# Patient Record
Sex: FEMALE | Race: WHITE | NOT HISPANIC OR LATINO | Employment: FULL TIME
[De-identification: names, ages, dates, MRNs, and addresses within clinical notes are randomized per-mention and may not be internally consistent; named-entity substitution may affect disease eponyms.]

---

## 2017-01-03 ENCOUNTER — HOSPITAL ENCOUNTER (OUTPATIENT)
Dept: PHYSICAL THERAPY | Age: 32
Setting detail: THERAPIES SERIES
Discharge: STILL ADMITTED OR ADMINISTRATIVE | End: 2017-01-03
Attending: NURSE PRACTITIONER

## 2017-01-03 ENCOUNTER — HOSPITAL ENCOUNTER (OUTPATIENT)
Dept: NEUROLOGY | Facility: CLINIC | Age: 32
Setting detail: THERAPIES SERIES
Discharge: STILL ADMITTED OR ADMINISTRATIVE | End: 2017-01-03
Attending: NURSE PRACTITIONER

## 2017-01-03 DIAGNOSIS — G44.209 TENSION HEADACHE: ICD-10-CM

## 2017-01-03 DIAGNOSIS — F43.23 ADJUSTMENT DISORDER WITH MIXED ANXIETY AND DEPRESSED MOOD: ICD-10-CM

## 2017-01-03 DIAGNOSIS — F07.81 POST CONCUSSION SYNDROME: ICD-10-CM

## 2017-01-03 DIAGNOSIS — M54.2 NECK PAIN: ICD-10-CM

## 2017-01-04 ENCOUNTER — OFFICE VISIT - HEALTHEAST (OUTPATIENT)
Dept: PHYSICAL THERAPY | Facility: REHABILITATION | Age: 32
End: 2017-01-04

## 2017-01-04 DIAGNOSIS — M53.3 SI (SACROILIAC) JOINT DYSFUNCTION: ICD-10-CM

## 2017-01-04 DIAGNOSIS — M25.551 PAIN IN JOINT, PELVIC REGION AND THIGH, RIGHT: ICD-10-CM

## 2017-01-04 DIAGNOSIS — M62.81 MUSCLE WEAKNESS (GENERALIZED): ICD-10-CM

## 2017-01-05 ENCOUNTER — HOSPITAL ENCOUNTER (OUTPATIENT)
Dept: OCCUPATIONAL THERAPY | Age: 32
Setting detail: THERAPIES SERIES
Discharge: STILL ADMITTED OR ADMINISTRATIVE | End: 2017-01-05
Attending: OCCUPATIONAL THERAPIST

## 2017-01-05 DIAGNOSIS — H53.9 VISUAL DISTURBANCE: ICD-10-CM

## 2017-01-05 DIAGNOSIS — F07.81 POST CONCUSSION SYNDROME: ICD-10-CM

## 2017-01-11 ENCOUNTER — OFFICE VISIT - HEALTHEAST (OUTPATIENT)
Dept: PHYSICAL THERAPY | Facility: REHABILITATION | Age: 32
End: 2017-01-11

## 2017-01-11 DIAGNOSIS — M25.551 PAIN IN JOINT, PELVIC REGION AND THIGH, RIGHT: ICD-10-CM

## 2017-01-11 DIAGNOSIS — M53.3 SI (SACROILIAC) JOINT DYSFUNCTION: ICD-10-CM

## 2017-01-11 DIAGNOSIS — M62.81 MUSCLE WEAKNESS (GENERALIZED): ICD-10-CM

## 2017-01-12 ENCOUNTER — HOSPITAL ENCOUNTER (OUTPATIENT)
Dept: NEUROLOGY | Facility: CLINIC | Age: 32
Setting detail: THERAPIES SERIES
Discharge: STILL ADMITTED OR ADMINISTRATIVE | End: 2017-01-12
Attending: PSYCHOLOGIST

## 2017-01-12 DIAGNOSIS — F07.81 POST CONCUSSION SYNDROME: ICD-10-CM

## 2017-01-12 DIAGNOSIS — Z34.90 PREGNANCY: ICD-10-CM

## 2017-01-12 DIAGNOSIS — S06.9XAS HEADACHE AS LATE EFFECT OF BRAIN INJURY (H): ICD-10-CM

## 2017-01-12 DIAGNOSIS — S06.9X0S MILD TBI, WITHOUT LOSS OF CONSCIOUSNESS, SEQUELA: ICD-10-CM

## 2017-01-12 DIAGNOSIS — F43.23 ADJUSTMENT DISORDER WITH MIXED ANXIETY AND DEPRESSED MOOD: ICD-10-CM

## 2017-01-12 DIAGNOSIS — G44.309 HEADACHE AS LATE EFFECT OF BRAIN INJURY (H): ICD-10-CM

## 2017-01-17 ENCOUNTER — HOSPITAL ENCOUNTER (OUTPATIENT)
Dept: NEUROLOGY | Facility: CLINIC | Age: 32
Setting detail: THERAPIES SERIES
Discharge: STILL ADMITTED OR ADMINISTRATIVE | End: 2017-01-17
Attending: NURSE PRACTITIONER

## 2017-01-17 ENCOUNTER — HOSPITAL ENCOUNTER (OUTPATIENT)
Dept: PHYSICAL THERAPY | Age: 32
Setting detail: THERAPIES SERIES
Discharge: STILL ADMITTED OR ADMINISTRATIVE | End: 2017-01-17
Attending: PHYSICAL THERAPIST

## 2017-01-17 DIAGNOSIS — G44.209 TENSION HEADACHE: ICD-10-CM

## 2017-01-17 DIAGNOSIS — F06.4 ANXIETY DISORDER DUE TO MEDICAL CONDITION: ICD-10-CM

## 2017-01-17 DIAGNOSIS — M54.2 NECK PAIN: ICD-10-CM

## 2017-01-17 DIAGNOSIS — F07.81 POST CONCUSSION SYNDROME: ICD-10-CM

## 2017-01-17 DIAGNOSIS — S06.9XAS HEADACHE AS LATE EFFECT OF BRAIN INJURY (H): ICD-10-CM

## 2017-01-17 DIAGNOSIS — G44.309 HEADACHE AS LATE EFFECT OF BRAIN INJURY (H): ICD-10-CM

## 2017-01-18 ENCOUNTER — OFFICE VISIT - HEALTHEAST (OUTPATIENT)
Dept: FAMILY MEDICINE | Facility: CLINIC | Age: 32
End: 2017-01-18

## 2017-01-18 ENCOUNTER — OFFICE VISIT - HEALTHEAST (OUTPATIENT)
Dept: PHYSICAL THERAPY | Facility: REHABILITATION | Age: 32
End: 2017-01-18

## 2017-01-18 DIAGNOSIS — M53.3 SI (SACROILIAC) JOINT DYSFUNCTION: ICD-10-CM

## 2017-01-18 DIAGNOSIS — J32.9 SINUSITIS: ICD-10-CM

## 2017-01-18 DIAGNOSIS — J45.20 MILD INTERMITTENT ASTHMA WITHOUT COMPLICATION: ICD-10-CM

## 2017-01-18 DIAGNOSIS — M62.81 MUSCLE WEAKNESS (GENERALIZED): ICD-10-CM

## 2017-01-18 DIAGNOSIS — M25.551 PAIN IN JOINT, PELVIC REGION AND THIGH, RIGHT: ICD-10-CM

## 2017-01-18 ASSESSMENT — MIFFLIN-ST. JEOR: SCORE: 1641.16

## 2017-01-25 ENCOUNTER — OFFICE VISIT - HEALTHEAST (OUTPATIENT)
Dept: PHYSICAL THERAPY | Facility: REHABILITATION | Age: 32
End: 2017-01-25

## 2017-01-25 DIAGNOSIS — M25.551 PAIN IN JOINT, PELVIC REGION AND THIGH, RIGHT: ICD-10-CM

## 2017-01-25 DIAGNOSIS — M53.3 SI (SACROILIAC) JOINT DYSFUNCTION: ICD-10-CM

## 2017-01-25 DIAGNOSIS — M62.81 MUSCLE WEAKNESS (GENERALIZED): ICD-10-CM

## 2017-01-26 ENCOUNTER — HOSPITAL ENCOUNTER (OUTPATIENT)
Dept: OBGYN | Facility: HOSPITAL | Age: 32
Discharge: HOME | End: 2017-01-26
Attending: OBSTETRICS & GYNECOLOGY | Admitting: OBSTETRICS & GYNECOLOGY

## 2017-01-26 ASSESSMENT — MIFFLIN-ST. JEOR: SCORE: 1641.16

## 2017-01-31 ENCOUNTER — HOSPITAL ENCOUNTER (OUTPATIENT)
Dept: NEUROLOGY | Facility: CLINIC | Age: 32
Setting detail: THERAPIES SERIES
Discharge: STILL ADMITTED OR ADMINISTRATIVE | End: 2017-01-31
Attending: NURSE PRACTITIONER

## 2017-01-31 DIAGNOSIS — S06.9XAS HEADACHE AS LATE EFFECT OF BRAIN INJURY (H): ICD-10-CM

## 2017-01-31 DIAGNOSIS — F07.81 POST CONCUSSION SYNDROME: ICD-10-CM

## 2017-01-31 DIAGNOSIS — G44.309 HEADACHE AS LATE EFFECT OF BRAIN INJURY (H): ICD-10-CM

## 2017-02-03 ENCOUNTER — ANESTHESIA - HEALTHEAST (OUTPATIENT)
Dept: OBGYN | Facility: CLINIC | Age: 32
End: 2017-02-03

## 2017-02-06 ENCOUNTER — HOME CARE/HOSPICE - HEALTHEAST (OUTPATIENT)
Dept: HOME HEALTH SERVICES | Facility: HOME HEALTH | Age: 32
End: 2017-02-06

## 2017-02-07 ENCOUNTER — COMMUNICATION - HEALTHEAST (OUTPATIENT)
Dept: OBGYN | Facility: CLINIC | Age: 32
End: 2017-02-07

## 2017-02-28 ENCOUNTER — HOSPITAL ENCOUNTER (OUTPATIENT)
Dept: NEUROLOGY | Facility: CLINIC | Age: 32
Setting detail: THERAPIES SERIES
Discharge: STILL ADMITTED OR ADMINISTRATIVE | End: 2017-02-28
Attending: NURSE PRACTITIONER

## 2017-02-28 DIAGNOSIS — R41.3 MEMORY DEFICIT: ICD-10-CM

## 2017-02-28 DIAGNOSIS — F07.81 POST CONCUSSION SYNDROME: ICD-10-CM

## 2017-03-09 ENCOUNTER — HOSPITAL ENCOUNTER (OUTPATIENT)
Dept: PHYSICAL THERAPY | Age: 32
Setting detail: THERAPIES SERIES
Discharge: STILL ADMITTED OR ADMINISTRATIVE | End: 2017-03-09
Attending: NURSE PRACTITIONER

## 2017-03-09 ENCOUNTER — HOSPITAL ENCOUNTER (OUTPATIENT)
Dept: SPEECH THERAPY | Age: 32
Setting detail: THERAPIES SERIES
Discharge: STILL ADMITTED OR ADMINISTRATIVE | End: 2017-03-09
Attending: NURSE PRACTITIONER

## 2017-03-09 DIAGNOSIS — R41.3 MEMORY DEFICIT: ICD-10-CM

## 2017-03-09 DIAGNOSIS — F07.81 POST CONCUSSION SYNDROME: ICD-10-CM

## 2017-03-09 DIAGNOSIS — M54.2 NECK PAIN: ICD-10-CM

## 2017-03-15 ENCOUNTER — HOSPITAL ENCOUNTER (OUTPATIENT)
Dept: NEUROLOGY | Facility: CLINIC | Age: 32
Setting detail: THERAPIES SERIES
Discharge: STILL ADMITTED OR ADMINISTRATIVE | End: 2017-03-15

## 2017-03-15 DIAGNOSIS — F43.23 ADJUSTMENT DISORDER WITH MIXED ANXIETY AND DEPRESSED MOOD: ICD-10-CM

## 2017-03-27 ENCOUNTER — HOSPITAL ENCOUNTER (OUTPATIENT)
Dept: NEUROLOGY | Facility: CLINIC | Age: 32
Setting detail: THERAPIES SERIES
Discharge: STILL ADMITTED OR ADMINISTRATIVE | End: 2017-03-27
Attending: NURSE PRACTITIONER

## 2017-03-27 DIAGNOSIS — G44.309 HEADACHE AS LATE EFFECT OF BRAIN INJURY (H): ICD-10-CM

## 2017-03-27 DIAGNOSIS — F07.81 POST CONCUSSION SYNDROME: ICD-10-CM

## 2017-03-27 DIAGNOSIS — F43.23 ADJUSTMENT DISORDER WITH MIXED ANXIETY AND DEPRESSED MOOD: ICD-10-CM

## 2017-03-27 DIAGNOSIS — S06.9XAS HEADACHE AS LATE EFFECT OF BRAIN INJURY (H): ICD-10-CM

## 2017-04-10 ENCOUNTER — HOSPITAL ENCOUNTER (OUTPATIENT)
Dept: NEUROLOGY | Facility: CLINIC | Age: 32
Setting detail: THERAPIES SERIES
Discharge: STILL ADMITTED OR ADMINISTRATIVE | End: 2017-04-10
Attending: NURSE PRACTITIONER

## 2017-04-10 DIAGNOSIS — G44.309 POST-CONCUSSION HEADACHE: ICD-10-CM

## 2017-04-10 DIAGNOSIS — F06.4 ANXIETY DISORDER DUE TO MEDICAL CONDITION: ICD-10-CM

## 2017-04-10 DIAGNOSIS — R41.840 POOR CONCENTRATION: ICD-10-CM

## 2017-05-01 ENCOUNTER — HOSPITAL ENCOUNTER (OUTPATIENT)
Dept: NEUROLOGY | Facility: CLINIC | Age: 32
Setting detail: THERAPIES SERIES
Discharge: STILL ADMITTED OR ADMINISTRATIVE | End: 2017-05-01
Attending: NURSE PRACTITIONER

## 2017-05-01 DIAGNOSIS — G44.309 POST-CONCUSSION HEADACHE: ICD-10-CM

## 2017-05-10 ENCOUNTER — COMMUNICATION - HEALTHEAST (OUTPATIENT)
Dept: NEUROLOGY | Facility: CLINIC | Age: 32
End: 2017-05-10

## 2017-05-25 ENCOUNTER — COMMUNICATION - HEALTHEAST (OUTPATIENT)
Dept: NEUROLOGY | Facility: CLINIC | Age: 32
End: 2017-05-25

## 2017-05-26 ENCOUNTER — HOSPITAL ENCOUNTER (OUTPATIENT)
Dept: NEUROLOGY | Facility: CLINIC | Age: 32
Setting detail: THERAPIES SERIES
Discharge: STILL ADMITTED OR ADMINISTRATIVE | End: 2017-05-26
Attending: NURSE PRACTITIONER

## 2017-05-26 DIAGNOSIS — G44.309 POST-CONCUSSION HEADACHE: ICD-10-CM

## 2017-05-26 DIAGNOSIS — F07.81 POST CONCUSSION SYNDROME: ICD-10-CM

## 2017-05-26 DIAGNOSIS — F06.4 ANXIETY DISORDER DUE TO MEDICAL CONDITION: ICD-10-CM

## 2017-05-30 ENCOUNTER — HOSPITAL ENCOUNTER (OUTPATIENT)
Dept: NEUROLOGY | Facility: CLINIC | Age: 32
Setting detail: THERAPIES SERIES
Discharge: STILL ADMITTED OR ADMINISTRATIVE | End: 2017-05-30
Attending: NURSE PRACTITIONER

## 2017-06-16 ENCOUNTER — HOSPITAL ENCOUNTER (OUTPATIENT)
Dept: NEUROLOGY | Facility: CLINIC | Age: 32
Setting detail: THERAPIES SERIES
Discharge: STILL ADMITTED OR ADMINISTRATIVE | End: 2017-06-16
Attending: NURSE PRACTITIONER

## 2017-06-16 DIAGNOSIS — G44.309 POST-CONCUSSION HEADACHE: ICD-10-CM

## 2017-06-16 DIAGNOSIS — F07.81 POST CONCUSSION SYNDROME: ICD-10-CM

## 2017-07-07 ENCOUNTER — HOSPITAL ENCOUNTER (OUTPATIENT)
Dept: NEUROLOGY | Facility: CLINIC | Age: 32
Setting detail: THERAPIES SERIES
Discharge: STILL ADMITTED OR ADMINISTRATIVE | End: 2017-07-07
Attending: NURSE PRACTITIONER

## 2017-07-07 DIAGNOSIS — F07.81 POST CONCUSSION SYNDROME: ICD-10-CM

## 2017-07-07 DIAGNOSIS — G44.309 POST-CONCUSSION HEADACHE: ICD-10-CM

## 2017-07-07 DIAGNOSIS — F06.4 ANXIETY DISORDER DUE TO MEDICAL CONDITION: ICD-10-CM

## 2017-07-10 ENCOUNTER — AMBULATORY - HEALTHEAST (OUTPATIENT)
Dept: NEUROLOGY | Facility: CLINIC | Age: 32
End: 2017-07-10

## 2017-08-08 ENCOUNTER — HOSPITAL ENCOUNTER (OUTPATIENT)
Dept: NEUROLOGY | Facility: CLINIC | Age: 32
Setting detail: THERAPIES SERIES
Discharge: STILL ADMITTED OR ADMINISTRATIVE | End: 2017-08-08
Attending: NURSE PRACTITIONER

## 2017-08-08 DIAGNOSIS — F07.81 POST CONCUSSION SYNDROME: ICD-10-CM

## 2017-08-08 DIAGNOSIS — G44.309 POST-CONCUSSION HEADACHE: ICD-10-CM

## 2017-08-09 ENCOUNTER — HOSPITAL ENCOUNTER (OUTPATIENT)
Dept: PHYSICAL THERAPY | Age: 32
Setting detail: THERAPIES SERIES
Discharge: STILL ADMITTED OR ADMINISTRATIVE | End: 2017-08-09
Attending: NURSE PRACTITIONER

## 2017-08-09 DIAGNOSIS — G44.209 TENSION HEADACHE: ICD-10-CM

## 2017-08-09 DIAGNOSIS — M54.2 CERVICAL PAIN: ICD-10-CM

## 2017-08-09 DIAGNOSIS — G44.309 POST-CONCUSSION HEADACHE: ICD-10-CM

## 2017-08-21 ENCOUNTER — HOSPITAL ENCOUNTER (OUTPATIENT)
Dept: PHYSICAL THERAPY | Age: 32
Setting detail: THERAPIES SERIES
Discharge: STILL ADMITTED OR ADMINISTRATIVE | End: 2017-08-21
Attending: PHYSICAL THERAPIST

## 2017-08-21 ENCOUNTER — HOSPITAL ENCOUNTER (OUTPATIENT)
Dept: NEUROLOGY | Facility: CLINIC | Age: 32
Setting detail: THERAPIES SERIES
Discharge: STILL ADMITTED OR ADMINISTRATIVE | End: 2017-08-21
Attending: NURSE PRACTITIONER

## 2017-08-21 DIAGNOSIS — F43.22 ADJUSTMENT DISORDER WITH ANXIETY: ICD-10-CM

## 2017-08-21 DIAGNOSIS — M54.2 CERVICAL PAIN: ICD-10-CM

## 2017-08-21 DIAGNOSIS — G44.309 POST-CONCUSSION HEADACHE: ICD-10-CM

## 2017-08-21 DIAGNOSIS — M62.81 MUSCLE WEAKNESS (GENERALIZED): ICD-10-CM

## 2017-08-21 DIAGNOSIS — G44.209 TENSION HEADACHE: ICD-10-CM

## 2017-08-21 DIAGNOSIS — F07.81 POST CONCUSSION SYNDROME: ICD-10-CM

## 2017-08-28 ENCOUNTER — HOSPITAL ENCOUNTER (OUTPATIENT)
Dept: PHYSICAL THERAPY | Age: 32
Setting detail: THERAPIES SERIES
Discharge: STILL ADMITTED OR ADMINISTRATIVE | End: 2017-08-28
Attending: NURSE PRACTITIONER

## 2017-08-28 DIAGNOSIS — M54.2 CERVICAL PAIN: ICD-10-CM

## 2017-08-28 DIAGNOSIS — G44.209 TENSION HEADACHE: ICD-10-CM

## 2017-09-06 ENCOUNTER — HOSPITAL ENCOUNTER (OUTPATIENT)
Dept: PHYSICAL THERAPY | Age: 32
Setting detail: THERAPIES SERIES
Discharge: STILL ADMITTED OR ADMINISTRATIVE | End: 2017-09-06
Attending: NURSE PRACTITIONER

## 2017-09-06 DIAGNOSIS — G44.209 TENSION HEADACHE: ICD-10-CM

## 2017-09-06 DIAGNOSIS — M54.2 CERVICAL PAIN: ICD-10-CM

## 2017-09-11 ENCOUNTER — HOSPITAL ENCOUNTER (OUTPATIENT)
Dept: PHYSICAL THERAPY | Age: 32
Setting detail: THERAPIES SERIES
Discharge: STILL ADMITTED OR ADMINISTRATIVE | End: 2017-09-11
Attending: PHYSICAL THERAPIST

## 2017-09-11 ENCOUNTER — HOSPITAL ENCOUNTER (OUTPATIENT)
Dept: NEUROLOGY | Facility: CLINIC | Age: 32
Setting detail: THERAPIES SERIES
Discharge: STILL ADMITTED OR ADMINISTRATIVE | End: 2017-09-11
Attending: NURSE PRACTITIONER

## 2017-09-11 DIAGNOSIS — G44.209 TENSION HEADACHE: ICD-10-CM

## 2017-09-11 DIAGNOSIS — G44.309 POST-CONCUSSION HEADACHE: ICD-10-CM

## 2017-09-11 DIAGNOSIS — F43.22 ADJUSTMENT DISORDER WITH ANXIETY: ICD-10-CM

## 2017-09-11 DIAGNOSIS — M54.2 CERVICAL PAIN: ICD-10-CM

## 2017-09-11 DIAGNOSIS — F07.81 POST CONCUSSION SYNDROME: ICD-10-CM

## 2017-10-03 ENCOUNTER — HOSPITAL ENCOUNTER (OUTPATIENT)
Dept: NEUROLOGY | Facility: CLINIC | Age: 32
Setting detail: THERAPIES SERIES
Discharge: STILL ADMITTED OR ADMINISTRATIVE | End: 2017-10-03
Attending: NURSE PRACTITIONER

## 2017-10-03 DIAGNOSIS — F43.22 ADJUSTMENT DISORDER WITH ANXIETY: ICD-10-CM

## 2017-10-03 DIAGNOSIS — G44.309 POST-CONCUSSION HEADACHE: ICD-10-CM

## 2017-10-03 DIAGNOSIS — F07.81 POST CONCUSSION SYNDROME: ICD-10-CM

## 2017-10-12 ENCOUNTER — HOSPITAL ENCOUNTER (OUTPATIENT)
Dept: PHYSICAL THERAPY | Age: 32
Setting detail: THERAPIES SERIES
Discharge: STILL ADMITTED OR ADMINISTRATIVE | End: 2017-10-12
Attending: PHYSICAL THERAPIST

## 2017-10-12 DIAGNOSIS — M54.2 CERVICAL PAIN: ICD-10-CM

## 2017-10-12 DIAGNOSIS — G44.209 TENSION HEADACHE: ICD-10-CM

## 2017-11-02 ENCOUNTER — HOSPITAL ENCOUNTER (OUTPATIENT)
Dept: NEUROLOGY | Facility: CLINIC | Age: 32
Setting detail: THERAPIES SERIES
Discharge: STILL ADMITTED OR ADMINISTRATIVE | End: 2017-11-02
Attending: NURSE PRACTITIONER

## 2017-11-02 DIAGNOSIS — F07.81 POST CONCUSSION SYNDROME: ICD-10-CM

## 2017-11-02 DIAGNOSIS — G44.309 POST-CONCUSSION HEADACHE: ICD-10-CM

## 2017-11-02 DIAGNOSIS — F43.22 ADJUSTMENT DISORDER WITH ANXIETY: ICD-10-CM

## 2017-12-07 ENCOUNTER — HOSPITAL ENCOUNTER (OUTPATIENT)
Dept: NEUROLOGY | Facility: CLINIC | Age: 32
Setting detail: THERAPIES SERIES
Discharge: STILL ADMITTED OR ADMINISTRATIVE | End: 2017-12-07
Attending: NURSE PRACTITIONER

## 2017-12-07 DIAGNOSIS — G44.309 POST-CONCUSSION HEADACHE: ICD-10-CM

## 2017-12-07 DIAGNOSIS — F07.81 POST CONCUSSION SYNDROME: ICD-10-CM

## 2017-12-07 DIAGNOSIS — F43.22 ADJUSTMENT DISORDER WITH ANXIETY: ICD-10-CM

## 2018-01-10 ENCOUNTER — COMMUNICATION - HEALTHEAST (OUTPATIENT)
Dept: FAMILY MEDICINE | Facility: CLINIC | Age: 33
End: 2018-01-10

## 2018-01-10 DIAGNOSIS — J45.20 MILD INTERMITTENT ASTHMA WITHOUT COMPLICATION: ICD-10-CM

## 2018-02-09 ENCOUNTER — OFFICE VISIT - HEALTHEAST (OUTPATIENT)
Dept: FAMILY MEDICINE | Facility: CLINIC | Age: 33
End: 2018-02-09

## 2018-02-09 DIAGNOSIS — J45.901 ASTHMA EXACERBATION: ICD-10-CM

## 2018-02-09 LAB
FLUAV AG SPEC QL IA: NORMAL
FLUBV AG SPEC QL IA: NORMAL

## 2018-02-21 ENCOUNTER — HOSPITAL ENCOUNTER (OUTPATIENT)
Dept: NEUROLOGY | Facility: CLINIC | Age: 33
Setting detail: THERAPIES SERIES
Discharge: STILL ADMITTED OR ADMINISTRATIVE | End: 2018-02-21
Attending: NURSE PRACTITIONER

## 2018-02-21 DIAGNOSIS — F06.4 ANXIETY DISORDER DUE TO MEDICAL CONDITION: ICD-10-CM

## 2018-02-21 DIAGNOSIS — S06.0X0D CONCUSSION WITHOUT LOSS OF CONSCIOUSNESS, SUBSEQUENT ENCOUNTER: ICD-10-CM

## 2018-02-21 DIAGNOSIS — F07.81 POST CONCUSSION SYNDROME: ICD-10-CM

## 2018-02-21 DIAGNOSIS — G44.309 POST-CONCUSSION HEADACHE: ICD-10-CM

## 2018-02-21 DIAGNOSIS — F43.22 ADJUSTMENT DISORDER WITH ANXIETY: ICD-10-CM

## 2018-03-22 ENCOUNTER — HOSPITAL ENCOUNTER (OUTPATIENT)
Dept: NEUROLOGY | Facility: CLINIC | Age: 33
Setting detail: THERAPIES SERIES
Discharge: STILL ADMITTED OR ADMINISTRATIVE | End: 2018-03-22
Attending: NURSE PRACTITIONER

## 2018-03-22 DIAGNOSIS — G44.309 POST-CONCUSSION HEADACHE: ICD-10-CM

## 2018-03-22 DIAGNOSIS — F06.4 ANXIETY DISORDER DUE TO MEDICAL CONDITION: ICD-10-CM

## 2018-03-22 DIAGNOSIS — F43.22 ADJUSTMENT DISORDER WITH ANXIETY: ICD-10-CM

## 2018-03-22 DIAGNOSIS — F07.81 POST CONCUSSION SYNDROME: ICD-10-CM

## 2018-05-17 ENCOUNTER — HOSPITAL ENCOUNTER (OUTPATIENT)
Dept: NEUROLOGY | Facility: CLINIC | Age: 33
Setting detail: THERAPIES SERIES
Discharge: STILL ADMITTED OR ADMINISTRATIVE | End: 2018-05-17
Attending: NURSE PRACTITIONER

## 2018-05-17 DIAGNOSIS — F06.4 ANXIETY DISORDER DUE TO MEDICAL CONDITION: ICD-10-CM

## 2018-05-17 DIAGNOSIS — F07.81 POST CONCUSSION SYNDROME: ICD-10-CM

## 2018-05-17 DIAGNOSIS — F43.22 ADJUSTMENT DISORDER WITH ANXIETY: ICD-10-CM

## 2018-06-14 ENCOUNTER — OFFICE VISIT - HEALTHEAST (OUTPATIENT)
Dept: FAMILY MEDICINE | Facility: CLINIC | Age: 33
End: 2018-06-14

## 2018-06-14 DIAGNOSIS — M54.2 NECK PAIN ON LEFT SIDE: ICD-10-CM

## 2018-07-19 ENCOUNTER — HOSPITAL ENCOUNTER (OUTPATIENT)
Dept: NEUROLOGY | Facility: CLINIC | Age: 33
Setting detail: THERAPIES SERIES
Discharge: STILL ADMITTED OR ADMINISTRATIVE | End: 2018-07-19
Attending: NURSE PRACTITIONER

## 2018-07-19 DIAGNOSIS — F43.22 ADJUSTMENT DISORDER WITH ANXIETY: ICD-10-CM

## 2018-07-19 DIAGNOSIS — Z76.89 RETURN TO WORK EVALUATION: ICD-10-CM

## 2018-07-19 DIAGNOSIS — G44.309 POST-CONCUSSION HEADACHE: ICD-10-CM

## 2018-07-19 DIAGNOSIS — F07.81 POST CONCUSSION SYNDROME: ICD-10-CM

## 2018-08-05 ENCOUNTER — RECORDS - HEALTHEAST (OUTPATIENT)
Dept: ADMINISTRATIVE | Facility: OTHER | Age: 33
End: 2018-08-05

## 2018-09-28 ENCOUNTER — OFFICE VISIT - HEALTHEAST (OUTPATIENT)
Dept: FAMILY MEDICINE | Facility: CLINIC | Age: 33
End: 2018-09-28

## 2018-09-28 DIAGNOSIS — Z00.00 HEALTHCARE MAINTENANCE: ICD-10-CM

## 2018-09-28 DIAGNOSIS — K64.4 EXTERNAL HEMORRHOIDS: ICD-10-CM

## 2018-09-28 DIAGNOSIS — J45.30 MILD PERSISTENT ASTHMA WITHOUT COMPLICATION: ICD-10-CM

## 2018-09-28 DIAGNOSIS — J45.20 MILD INTERMITTENT ASTHMA WITHOUT COMPLICATION: ICD-10-CM

## 2018-09-28 DIAGNOSIS — F43.22 ADJUSTMENT DISORDER WITH ANXIETY: ICD-10-CM

## 2018-09-28 DIAGNOSIS — Z86.2 HISTORY OF ANEMIA: ICD-10-CM

## 2018-09-28 LAB
ALBUMIN SERPL-MCNC: 4.4 G/DL (ref 3.5–5)
ALP SERPL-CCNC: 64 U/L (ref 45–120)
ALT SERPL W P-5'-P-CCNC: 17 U/L (ref 0–45)
ANION GAP SERPL CALCULATED.3IONS-SCNC: 10 MMOL/L (ref 5–18)
AST SERPL W P-5'-P-CCNC: 18 U/L (ref 0–40)
BILIRUB SERPL-MCNC: 1.5 MG/DL (ref 0–1)
BUN SERPL-MCNC: 20 MG/DL (ref 8–22)
CALCIUM SERPL-MCNC: 10 MG/DL (ref 8.5–10.5)
CHLORIDE BLD-SCNC: 108 MMOL/L (ref 98–107)
CHOLEST SERPL-MCNC: 186 MG/DL
CO2 SERPL-SCNC: 22 MMOL/L (ref 22–31)
CREAT SERPL-MCNC: 0.77 MG/DL (ref 0.6–1.1)
FASTING STATUS PATIENT QL REPORTED: YES
GFR SERPL CREATININE-BSD FRML MDRD: >60 ML/MIN/1.73M2
GLUCOSE BLD-MCNC: 87 MG/DL (ref 70–125)
HDLC SERPL-MCNC: 59 MG/DL
HGB BLD-MCNC: 13 G/DL (ref 12–16)
LDLC SERPL CALC-MCNC: 110 MG/DL
POTASSIUM BLD-SCNC: 4.6 MMOL/L (ref 3.5–5)
PROT SERPL-MCNC: 7.4 G/DL (ref 6–8)
SODIUM SERPL-SCNC: 140 MMOL/L (ref 136–145)
TRIGL SERPL-MCNC: 86 MG/DL

## 2018-09-28 ASSESSMENT — MIFFLIN-ST. JEOR: SCORE: 1474.46

## 2018-11-01 ENCOUNTER — COMMUNICATION - HEALTHEAST (OUTPATIENT)
Dept: FAMILY MEDICINE | Facility: CLINIC | Age: 33
End: 2018-11-01

## 2018-12-07 ENCOUNTER — COMMUNICATION - HEALTHEAST (OUTPATIENT)
Dept: NEUROLOGY | Facility: CLINIC | Age: 33
End: 2018-12-07

## 2018-12-07 DIAGNOSIS — F43.22 ADJUSTMENT DISORDER WITH ANXIETY: ICD-10-CM

## 2019-02-08 ENCOUNTER — COMMUNICATION - HEALTHEAST (OUTPATIENT)
Dept: FAMILY MEDICINE | Facility: CLINIC | Age: 34
End: 2019-02-08

## 2019-02-08 DIAGNOSIS — J45.20 MILD INTERMITTENT ASTHMA WITHOUT COMPLICATION: ICD-10-CM

## 2019-02-22 ENCOUNTER — OFFICE VISIT - HEALTHEAST (OUTPATIENT)
Dept: FAMILY MEDICINE | Facility: CLINIC | Age: 34
End: 2019-02-22

## 2019-02-22 DIAGNOSIS — N64.4 BREAST PAIN: ICD-10-CM

## 2019-02-22 DIAGNOSIS — R42 DIZZINESS: ICD-10-CM

## 2019-02-22 LAB
ANION GAP SERPL CALCULATED.3IONS-SCNC: 9 MMOL/L (ref 5–18)
BUN SERPL-MCNC: 12 MG/DL (ref 8–22)
CALCIUM SERPL-MCNC: 9.3 MG/DL (ref 8.5–10.5)
CHLORIDE BLD-SCNC: 107 MMOL/L (ref 98–107)
CO2 SERPL-SCNC: 25 MMOL/L (ref 22–31)
CREAT SERPL-MCNC: 0.76 MG/DL (ref 0.6–1.1)
GFR SERPL CREATININE-BSD FRML MDRD: >60 ML/MIN/1.73M2
GLUCOSE BLD-MCNC: 92 MG/DL (ref 70–125)
HCG UR QL: NEGATIVE
HGB BLD-MCNC: 12.2 G/DL (ref 12–16)
POTASSIUM BLD-SCNC: 4.4 MMOL/L (ref 3.5–5)
SODIUM SERPL-SCNC: 141 MMOL/L (ref 136–145)
TSH SERPL DL<=0.005 MIU/L-ACNC: 1.55 UIU/ML (ref 0.3–5)

## 2019-02-22 ASSESSMENT — MIFFLIN-ST. JEOR: SCORE: 1405.57

## 2019-03-07 ENCOUNTER — COMMUNICATION - HEALTHEAST (OUTPATIENT)
Dept: FAMILY MEDICINE | Facility: CLINIC | Age: 34
End: 2019-03-07

## 2019-03-07 DIAGNOSIS — N64.4 BREAST PAIN: ICD-10-CM

## 2019-03-13 ENCOUNTER — HOSPITAL ENCOUNTER (OUTPATIENT)
Dept: ULTRASOUND IMAGING | Facility: CLINIC | Age: 34
Discharge: HOME | End: 2019-03-13
Attending: FAMILY MEDICINE

## 2019-03-13 ENCOUNTER — HOSPITAL ENCOUNTER (OUTPATIENT)
Dept: MAMMOGRAPHY | Facility: CLINIC | Age: 34
Discharge: HOME | End: 2019-03-13
Attending: FAMILY MEDICINE

## 2019-03-13 DIAGNOSIS — N64.4 BREAST PAIN: ICD-10-CM

## 2019-06-01 ENCOUNTER — COMMUNICATION - HEALTHEAST (OUTPATIENT)
Dept: NEUROLOGY | Facility: CLINIC | Age: 34
End: 2019-06-01

## 2019-06-01 DIAGNOSIS — F43.22 ADJUSTMENT DISORDER WITH ANXIETY: ICD-10-CM

## 2019-06-06 ENCOUNTER — RECORDS - HEALTHEAST (OUTPATIENT)
Dept: ADMINISTRATIVE | Facility: OTHER | Age: 34
End: 2019-06-06

## 2019-07-17 ENCOUNTER — RECORDS - HEALTHEAST (OUTPATIENT)
Dept: ADMINISTRATIVE | Facility: OTHER | Age: 34
End: 2019-07-17

## 2019-11-07 ENCOUNTER — COMMUNICATION - HEALTHEAST (OUTPATIENT)
Dept: FAMILY MEDICINE | Facility: CLINIC | Age: 34
End: 2019-11-07

## 2019-11-07 DIAGNOSIS — J45.20 MILD INTERMITTENT ASTHMA WITHOUT COMPLICATION: ICD-10-CM

## 2019-11-15 ENCOUNTER — RECORDS - HEALTHEAST (OUTPATIENT)
Dept: ADMINISTRATIVE | Facility: OTHER | Age: 34
End: 2019-11-15

## 2019-11-22 ENCOUNTER — RECORDS - HEALTHEAST (OUTPATIENT)
Dept: ADMINISTRATIVE | Facility: OTHER | Age: 34
End: 2019-11-22

## 2019-11-26 ENCOUNTER — COMMUNICATION - HEALTHEAST (OUTPATIENT)
Dept: NEUROLOGY | Facility: CLINIC | Age: 34
End: 2019-11-26

## 2019-11-26 DIAGNOSIS — F43.22 ADJUSTMENT DISORDER WITH ANXIETY: ICD-10-CM

## 2019-12-30 ENCOUNTER — RECORDS - HEALTHEAST (OUTPATIENT)
Dept: ADMINISTRATIVE | Facility: OTHER | Age: 34
End: 2019-12-30

## 2020-01-27 ENCOUNTER — COMMUNICATION - HEALTHEAST (OUTPATIENT)
Dept: NEUROLOGY | Facility: CLINIC | Age: 35
End: 2020-01-27

## 2020-01-27 DIAGNOSIS — F43.22 ADJUSTMENT DISORDER WITH ANXIETY: ICD-10-CM

## 2020-03-14 ENCOUNTER — COMMUNICATION - HEALTHEAST (OUTPATIENT)
Dept: FAMILY MEDICINE | Facility: CLINIC | Age: 35
End: 2020-03-14

## 2020-03-14 DIAGNOSIS — J45.20 MILD INTERMITTENT ASTHMA WITHOUT COMPLICATION: ICD-10-CM

## 2020-03-24 ENCOUNTER — VIRTUAL VISIT (OUTPATIENT)
Dept: FAMILY MEDICINE | Facility: OTHER | Age: 35
End: 2020-03-24

## 2020-03-24 NOTE — PROGRESS NOTES
"Date: 2020 10:51:47  Clinician: Catie Vaca  Clinician NPI: [id number]  Patient: Renetta Blankenship  Patient : 1985  Patient Address: [address]  [address] [id number]  Patient Phone: [phone]  Visit Protocol: URI  Patient Summary:  Renetta is a 34 year old ( : 1985 ) female who initiated a Visit for COVID-19 (Coronavirus) evaluation and screening. When asked the question \"Please sign me up to receive news, health information and promotions from [employer].\", Renetta responded \"No\".    Renetta states her symptoms started gradually 3-6 days ago. After her symptoms started, they improved and then got worse again.   Her symptoms consist of myalgia, wheezing, a sore throat, malaise, and a headache. She is experiencing mild difficulty breathing with activities but can speak normally in full sentences.   Symptom details     Sore throat: Renetta reports having severe throat pain (7-9 on a 10 point pain scale), does not have exudate on her tonsils, and can swallow liquids. She is not sure if the lymph nodes in her neck are enlarged. A rash has not appeared on the skin since the sore throat started.     Wheezing: Renetta has been diagnosed with asthma. The wheezing does not interfere with her normal daily activities.    Headache: She states the headache is moderate (4-6 on a 10 point pain scale).      Renetta denies having ear pain, rhinitis, facial pain or pressure, cough, nasal congestion, chills, teeth pain, and fever. She also denies having a sinus infection within the past year, taking antibiotic medication for the symptoms, and having recent facial or sinus surgery in the past 60 days.   Precipitating events  Within the past week, Renetta has not been exposed to someone with strep throat. She has not recently been exposed to someone with influenza. Renetta has been in close contact with the following high risk individuals: immunocompromised people, adults 65 or older, children under the age of 5, and people " with asthma, heart disease or diabetes.   Pertinent COVID-19 (Coronavirus) information  Renetta has not traveled internationally or to the areas where COVID-19 (Coronavirus) is widespread, including cruise ship travel in the last 14 days before the start of her symptoms.   Renetta has not had a close contact with a laboratory-confirmed COVID-19 patient within 14 days of symptom onset. She also has not had a close contact with a suspected COVID-19 patient within 14 days of symptom onset.   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   Pertinent medical history  Renetta does not get yeast infections when she takes antibiotics.   Reentta does not need a return to work/school note.   Weight: 186 lbs   Renetta does not smoke or use smokeless tobacco.   She denies pregnancy and denies breastfeeding. She has menstruated in the past month.   Additional information as reported by the patient (free text): Latent tb; asthma- inhalers not improving wheezing and sob as usual; wheezing when laying down; i work in healthcare; live with my  who had symptoms for 10 days along with fever for 2 days;   Weight: 186 lbs  A synchronous phone visit was initiated by the provider for the following reason: uncontrolled asthma with wheezing and SOB, will call.  likely recommend UC.    MEDICATIONS: Zyrtec oral, calcium-magnesium-zinc oral, Ventolin HFA inhalation, albuterol sulfate inhalation, prenatal vits-ferrous fumarate-folic acid-docusate sod oral, gabapentin oral, Wellbutrin XL oral, ALLERGIES: Kenalog, bacitracin  Clinician Response:  Dear Renetta,   Based on the information you have provided, you do have symptoms that are consistent with Coronavirus (COVID-19).  The coronavirus causes mild to severe respiratory illness with the most common symptoms including fever, cough and difficulty breathing. Unfortunately, many viruses cause similar symptoms and it can be difficult to distinguish  between viruses, especially in mild cases, so we are presuming that anyone with cough or fever has coronavirus at this time.  Coronavirus/COVID-19 has reached the point of community spread in [place], meaning that we are finding the virus in people with no known exposure risk for ainsley the virus. Given the increasing commonness of coronavirus in the community we are no longer testing patients who are not critically ill.  If you are a health care worker, you should refer to your employee health office for instructions about testing and returning to work.  For everyone else who has cough or fever, you should assume you are infected with coronavirus. Since you will not be tested but have symptoms that may be consistent with coronavirus, the CDC recommends you stay in self-isolation until these three things have happened:    You have had no fever for at least 72 hours (that is three full days of no fever without the use of medicine that reduces fevers)    AND   Other symptoms have improved (for example, when your cough or shortness of breath have improved)   AND   At least 7 days have passed since your symptoms first appeared.   How to Isolate:   Isolate yourself at home.  Do Not allow any visitors  Do Not go to work or school  Do Not go to [employer],  centers, shopping, or other public places.  Do Not shake hands.  Avoid close contact with others (hugging, kissing).   Protect Others:   Cover Your Mouth and Nose with a mask, disposable tissue or wash cloth to avoid spreading germs to others.  Wash your hands and face frequently with soap and water.   We know it can be scary to hear that you might have COVID-19. Our team can help track your symptoms and make sure you are doing ok over the next two weeks using a program called [employer] to keep in touch. When you receive an email from [employer], please consider enrolling in our monitoring program. There is no cost to you for monitoring. Here is a URL  where you can learn more: [email or web address]  Managing Symptoms:   At this time, we primarily recommend Tylenol (Acetaminophen) for fever or pain. If you have liver or kidney problems, contact your primary care provider for instructions on use of tylenol. Adults can take 650 mg (two 325 mg pills) by mouth every 4-6 hours as needed OR 1,000 mg (two 500 mg pills) every 8 hours as needed. MAXIMUM DAILY DOSE: 3,000mg. For children, refer to dosing on bottle based on age or weight.   If you develop significant shortness of breath that prevents you from doing normal activities, please call 911 or proceed to the nearest emergency room and alert them immediately that you have been in self-isolation for possible coronavirus.  For more information about COVID19 and options for caring for yourself at home, please visit the CDC website at https://www.cdc.gov/coronavirus/2019-ncov/about/steps-when-sick.htmlFor more options for care at [facility], please visit our website at [email or web address]    Diagnosis: Cough  Diagnosis ICD: R05  Triage Notes: I reviewed the patient's history, verified their identity, and explained the Visit process.    asthma not well controlled on Ventolin and Flovent.  We discussed prednisone PRN - she would like.  Noted allergy to Kenalog (from shoulder injection). She has taken Prednisone before and it was OK, but not since she her Kenalog injection.  She DOES have benadryl on hand.  Synchronous Triage: phone, status: completed, duration: 369 seconds  Prescription: prednisone 20 mg oral tablet 10 tablet, 0 days supply. Take 2 tablets by mouth 5 times per day in the am as needed for asthma flare/exacerbation. Refills: 0, Refill as needed: no, Allow substitutions: yes  Pharmacy: [id number] - [phone] - [address] [place] [address]

## 2020-04-02 ENCOUNTER — TELEPHONE (OUTPATIENT)
Dept: URGENT CARE | Facility: CLINIC | Age: 35
End: 2020-04-02
Payer: COMMERCIAL

## 2020-04-02 DIAGNOSIS — J45.31 MILD PERSISTENT ASTHMA WITH EXACERBATION: Primary | ICD-10-CM

## 2020-04-02 PROCEDURE — 99207 ZZC NO BILLABLE SERVICE THIS VISIT: CPT | Performed by: STUDENT IN AN ORGANIZED HEALTH CARE EDUCATION/TRAINING PROGRAM

## 2020-04-02 NOTE — TELEPHONE ENCOUNTER
"Renetta Blankenship is a 33yo female with pertinent past medical history of asthma. A telephone appointment was made due to patient's concerns on the [employer] about shortness of breath.     The patient is a healthcare worker. She has had a viral illness >1 week. She was recently told she tested negative for COVID. The patient was prescribed Prednisone at her [facility] visit on 3/24. She feels somewhat better (\"a 10 to an 8\"), but she continues to have shortness of breath and wheezing despite course of Prednisone. She states that her shortness of breath is worse with exertion and laying down at night. She says she does not notice it too much with rest. She has had associated wheezing. She denies fever, cough, weight gain, or LE edema. She has been using her Flovent as prescribed and her Ventolin inhaler 2-4x a day for shortness of breath.     PE:   General: AOx3, answers questions appropriately  Respiratory: Speaking in complete sentences    A/P:   #Asthma exacerbation  Patient's symptoms are consistent with persistent asthma exacerbation despite systemic steroids. Will prescribe Fluticasone-Vilanterol (Breo-Ellipta) for continued exacerbation. Once patient runs out of this medication, she can switch back to Flovent. Youtube video on use of Breo-Ellipta provided through [employer].     This patient was staffed with my attending, Dr. Kline.     Joanna Dove, DO  PGY2 Internal Medicine     I reviewed the telephone visit encounter and discussed the patient with the resident and agree with the resident s assessment and plan of care as documented.    Elizabeth Kline MD   "

## 2020-04-06 ENCOUNTER — COMMUNICATION - HEALTHEAST (OUTPATIENT)
Dept: FAMILY MEDICINE | Facility: CLINIC | Age: 35
End: 2020-04-06

## 2020-04-24 DIAGNOSIS — J45.31 MILD PERSISTENT ASTHMA WITH EXACERBATION: ICD-10-CM

## 2020-04-24 NOTE — TELEPHONE ENCOUNTER
Received refill request for breo.  Last in clinic not seen at [facility].  Pt has had virtual visit only.  Left message on voice mail for Renetta to return call to nursing [phone].  .

## 2020-04-24 NOTE — TELEPHONE ENCOUNTER
Spoke with  Renetta and she states she has a PCP at [facility].  She will contact them about the Breo refill.  She states she has been working with her PCP on this and has medication still left and had not requested refill.

## 2020-04-30 ENCOUNTER — COMMUNICATION - HEALTHEAST (OUTPATIENT)
Dept: FAMILY MEDICINE | Facility: CLINIC | Age: 35
End: 2020-04-30

## 2020-05-01 ENCOUNTER — COMMUNICATION - HEALTHEAST (OUTPATIENT)
Dept: EMERGENCY MEDICINE | Facility: CLINIC | Age: 35
End: 2020-05-01

## 2020-05-01 ENCOUNTER — COMMUNICATION - HEALTHEAST (OUTPATIENT)
Dept: FAMILY MEDICINE | Facility: CLINIC | Age: 35
End: 2020-05-01

## 2020-05-01 DIAGNOSIS — J45.30 MILD PERSISTENT ASTHMA WITHOUT COMPLICATION: ICD-10-CM

## 2020-05-11 ENCOUNTER — OFFICE VISIT - HEALTHEAST (OUTPATIENT)
Dept: FAMILY MEDICINE | Facility: CLINIC | Age: 35
End: 2020-05-11

## 2020-05-11 DIAGNOSIS — F43.22 ADJUSTMENT DISORDER WITH ANXIETY: ICD-10-CM

## 2020-05-11 DIAGNOSIS — J45.30 MILD PERSISTENT ASTHMA WITHOUT COMPLICATION: ICD-10-CM

## 2020-05-11 DIAGNOSIS — F07.81 POST CONCUSSION SYNDROME: ICD-10-CM

## 2020-05-17 ENCOUNTER — COMMUNICATION - HEALTHEAST (OUTPATIENT)
Dept: FAMILY MEDICINE | Facility: CLINIC | Age: 35
End: 2020-05-17

## 2020-05-20 ENCOUNTER — OFFICE VISIT - HEALTHEAST (OUTPATIENT)
Dept: FAMILY MEDICINE | Facility: CLINIC | Age: 35
End: 2020-05-20

## 2020-05-20 ENCOUNTER — COMMUNICATION - HEALTHEAST (OUTPATIENT)
Dept: FAMILY MEDICINE | Facility: CLINIC | Age: 35
End: 2020-05-20

## 2020-05-20 DIAGNOSIS — J45.909 PERSISTENT ASTHMA WITHOUT COMPLICATION, UNSPECIFIED ASTHMA SEVERITY: ICD-10-CM

## 2020-05-20 ASSESSMENT — ANXIETY QUESTIONNAIRES
IF YOU CHECKED OFF ANY PROBLEMS ON THIS QUESTIONNAIRE, HOW DIFFICULT HAVE THESE PROBLEMS MADE IT FOR YOU TO DO YOUR WORK, TAKE CARE OF THINGS AT HOME, OR GET ALONG WITH OTHER PEOPLE: SOMEWHAT DIFFICULT
3. WORRYING TOO MUCH ABOUT DIFFERENT THINGS: SEVERAL DAYS
GAD7 TOTAL SCORE: 5
5. BEING SO RESTLESS THAT IT IS HARD TO SIT STILL: NOT AT ALL
1. FEELING NERVOUS, ANXIOUS, OR ON EDGE: MORE THAN HALF THE DAYS
6. BECOMING EASILY ANNOYED OR IRRITABLE: SEVERAL DAYS
2. NOT BEING ABLE TO STOP OR CONTROL WORRYING: SEVERAL DAYS
7. FEELING AFRAID AS IF SOMETHING AWFUL MIGHT HAPPEN: NOT AT ALL
4. TROUBLE RELAXING: NOT AT ALL

## 2020-05-20 ASSESSMENT — PATIENT HEALTH QUESTIONNAIRE - PHQ9: SUM OF ALL RESPONSES TO PHQ QUESTIONS 1-9: 0

## 2020-05-21 ENCOUNTER — COMMUNICATION - HEALTHEAST (OUTPATIENT)
Dept: FAMILY MEDICINE | Facility: CLINIC | Age: 35
End: 2020-05-21

## 2020-05-24 ENCOUNTER — COMMUNICATION - HEALTHEAST (OUTPATIENT)
Dept: FAMILY MEDICINE | Facility: CLINIC | Age: 35
End: 2020-05-24

## 2020-05-24 DIAGNOSIS — J45.30 MILD PERSISTENT ASTHMA WITHOUT COMPLICATION: ICD-10-CM

## 2020-05-26 ENCOUNTER — OFFICE VISIT - HEALTHEAST (OUTPATIENT)
Dept: FAMILY MEDICINE | Facility: CLINIC | Age: 35
End: 2020-05-26

## 2020-05-26 DIAGNOSIS — J45.30 MILD PERSISTENT ASTHMA WITHOUT COMPLICATION: ICD-10-CM

## 2020-07-06 ENCOUNTER — COMMUNICATION - HEALTHEAST (OUTPATIENT)
Dept: FAMILY MEDICINE | Facility: CLINIC | Age: 35
End: 2020-07-06

## 2020-07-06 DIAGNOSIS — F43.22 ADJUSTMENT DISORDER WITH ANXIETY: ICD-10-CM

## 2020-07-15 ENCOUNTER — COMMUNICATION - HEALTHEAST (OUTPATIENT)
Dept: FAMILY MEDICINE | Facility: CLINIC | Age: 35
End: 2020-07-15

## 2020-07-16 ENCOUNTER — RECORDS - HEALTHEAST (OUTPATIENT)
Dept: ADMINISTRATIVE | Facility: OTHER | Age: 35
End: 2020-07-16

## 2020-08-10 DIAGNOSIS — Z11.59 SCREENING FOR VIRAL DISEASE: ICD-10-CM

## 2020-08-21 ENCOUNTER — OFFICE VISIT - HEALTHEAST (OUTPATIENT)
Dept: FAMILY MEDICINE | Facility: CLINIC | Age: 35
End: 2020-08-21

## 2020-08-21 DIAGNOSIS — Z11.4 SCREENING FOR HIV (HUMAN IMMUNODEFICIENCY VIRUS): ICD-10-CM

## 2020-08-21 DIAGNOSIS — Z00.00 HEALTHCARE MAINTENANCE: ICD-10-CM

## 2020-08-21 DIAGNOSIS — N64.52 BILATERAL NIPPLE DISCHARGE: ICD-10-CM

## 2020-08-21 DIAGNOSIS — F43.22 ADJUSTMENT DISORDER WITH ANXIETY: ICD-10-CM

## 2020-08-21 DIAGNOSIS — J45.30 MILD PERSISTENT ASTHMA WITHOUT COMPLICATION: ICD-10-CM

## 2020-08-21 DIAGNOSIS — N64.4 BREAST PAIN, RIGHT: ICD-10-CM

## 2020-08-21 DIAGNOSIS — Z86.2 HISTORY OF ANEMIA: ICD-10-CM

## 2020-08-21 LAB
ALBUMIN SERPL-MCNC: 4.2 G/DL (ref 3.5–5)
ALP SERPL-CCNC: 83 U/L (ref 45–120)
ALT SERPL W P-5'-P-CCNC: 15 U/L (ref 0–45)
ANION GAP SERPL CALCULATED.3IONS-SCNC: 12 MMOL/L (ref 5–18)
AST SERPL W P-5'-P-CCNC: 14 U/L (ref 0–40)
BILIRUB SERPL-MCNC: 0.7 MG/DL (ref 0–1)
BUN SERPL-MCNC: 14 MG/DL (ref 8–22)
CALCIUM SERPL-MCNC: 9.7 MG/DL (ref 8.5–10.5)
CHLORIDE BLD-SCNC: 103 MMOL/L (ref 98–107)
CHOLEST SERPL-MCNC: 218 MG/DL
CO2 SERPL-SCNC: 24 MMOL/L (ref 22–31)
CREAT SERPL-MCNC: 0.78 MG/DL (ref 0.6–1.1)
FASTING STATUS PATIENT QL REPORTED: NO
GFR SERPL CREATININE-BSD FRML MDRD: >60 ML/MIN/1.73M2
GLUCOSE BLD-MCNC: 83 MG/DL (ref 70–125)
HDLC SERPL-MCNC: 59 MG/DL
HGB BLD-MCNC: 13.9 G/DL (ref 12–16)
HIV 1+2 AB+HIV1 P24 AG SERPL QL IA: NEGATIVE
LDLC SERPL CALC-MCNC: 122 MG/DL
POTASSIUM BLD-SCNC: 3.9 MMOL/L (ref 3.5–5)
PROLACTIN SERPL-MCNC: 5.1 NG/ML (ref 0–20)
PROT SERPL-MCNC: 7.5 G/DL (ref 6–8)
SODIUM SERPL-SCNC: 139 MMOL/L (ref 136–145)
TRIGL SERPL-MCNC: 184 MG/DL

## 2020-08-21 ASSESSMENT — MIFFLIN-ST. JEOR: SCORE: 1582.19

## 2020-08-21 ASSESSMENT — PATIENT HEALTH QUESTIONNAIRE - PHQ9: SUM OF ALL RESPONSES TO PHQ QUESTIONS 1-9: 5

## 2020-08-24 ENCOUNTER — COMMUNICATION - HEALTHEAST (OUTPATIENT)
Dept: FAMILY MEDICINE | Facility: CLINIC | Age: 35
End: 2020-08-24

## 2020-09-08 ENCOUNTER — HOSPITAL ENCOUNTER (OUTPATIENT)
Dept: ULTRASOUND IMAGING | Facility: CLINIC | Age: 35
Discharge: HOME | End: 2020-09-08
Attending: FAMILY MEDICINE

## 2020-09-08 ENCOUNTER — HOSPITAL ENCOUNTER (OUTPATIENT)
Dept: MAMMOGRAPHY | Facility: CLINIC | Age: 35
Discharge: HOME | End: 2020-09-08
Attending: FAMILY MEDICINE

## 2020-09-08 DIAGNOSIS — N64.52 BILATERAL NIPPLE DISCHARGE: ICD-10-CM

## 2020-09-08 DIAGNOSIS — N64.4 BREAST PAIN, RIGHT: ICD-10-CM

## 2021-02-09 ENCOUNTER — RECORDS - HEALTHEAST (OUTPATIENT)
Dept: ADMINISTRATIVE | Facility: OTHER | Age: 36
End: 2021-02-09

## 2021-02-09 ENCOUNTER — COMMUNICATION - HEALTHEAST (OUTPATIENT)
Dept: FAMILY MEDICINE | Facility: CLINIC | Age: 36
End: 2021-02-09

## 2021-02-09 ENCOUNTER — OFFICE VISIT - HEALTHEAST (OUTPATIENT)
Dept: FAMILY MEDICINE | Facility: CLINIC | Age: 36
End: 2021-02-09

## 2021-02-09 DIAGNOSIS — L53.9 ERYTHEMATOUS CONDITION: ICD-10-CM

## 2021-02-09 DIAGNOSIS — L30.9 ECZEMA, UNSPECIFIED TYPE: ICD-10-CM

## 2021-03-23 ENCOUNTER — COMMUNICATION - HEALTHEAST (OUTPATIENT)
Dept: FAMILY MEDICINE | Facility: CLINIC | Age: 36
End: 2021-03-23

## 2021-05-26 ASSESSMENT — PATIENT HEALTH QUESTIONNAIRE - PHQ9
SUM OF ALL RESPONSES TO PHQ QUESTIONS 1-9: 0
SUM OF ALL RESPONSES TO PHQ QUESTIONS 1-9: 5

## 2021-05-28 ASSESSMENT — ASTHMA QUESTIONNAIRES: ACT_TOTALSCORE: 16

## 2021-05-28 ASSESSMENT — ANXIETY QUESTIONNAIRES: GAD7 TOTAL SCORE: 5

## 2021-05-30 VITALS — BODY MASS INDEX: 31.76 KG/M2 | WEIGHT: 185 LBS

## 2021-05-30 VITALS — WEIGHT: 177 LBS | BODY MASS INDEX: 30.38 KG/M2

## 2021-05-30 VITALS — BODY MASS INDEX: 30.38 KG/M2 | WEIGHT: 177 LBS

## 2021-05-30 VITALS — BODY MASS INDEX: 36.37 KG/M2 | WEIGHT: 213 LBS | HEIGHT: 64 IN

## 2021-05-30 VITALS — WEIGHT: 186 LBS | BODY MASS INDEX: 31.93 KG/M2

## 2021-05-30 VITALS — HEIGHT: 64 IN | WEIGHT: 213 LBS | BODY MASS INDEX: 36.37 KG/M2

## 2021-05-30 VITALS — WEIGHT: 180 LBS | BODY MASS INDEX: 30.9 KG/M2

## 2021-05-30 VITALS — BODY MASS INDEX: 36.9 KG/M2 | WEIGHT: 215 LBS

## 2021-05-30 VITALS — WEIGHT: 211 LBS | BODY MASS INDEX: 36.22 KG/M2

## 2021-05-31 VITALS — BODY MASS INDEX: 31.76 KG/M2 | WEIGHT: 185 LBS

## 2021-05-31 VITALS — WEIGHT: 181 LBS | BODY MASS INDEX: 31.07 KG/M2

## 2021-05-31 VITALS — BODY MASS INDEX: 32.1 KG/M2 | WEIGHT: 187 LBS

## 2021-05-31 VITALS — WEIGHT: 184 LBS | BODY MASS INDEX: 31.58 KG/M2

## 2021-05-31 VITALS — BODY MASS INDEX: 31.93 KG/M2 | WEIGHT: 186 LBS

## 2021-05-31 VITALS — WEIGHT: 175 LBS | BODY MASS INDEX: 30.04 KG/M2

## 2021-05-31 VITALS — WEIGHT: 187.5 LBS | BODY MASS INDEX: 32.18 KG/M2

## 2021-05-31 VITALS — WEIGHT: 179 LBS | BODY MASS INDEX: 30.73 KG/M2

## 2021-06-01 VITALS — HEIGHT: 64 IN | WEIGHT: 178 LBS | BODY MASS INDEX: 30.39 KG/M2

## 2021-06-01 VITALS — WEIGHT: 182.3 LBS | BODY MASS INDEX: 31.29 KG/M2

## 2021-06-01 VITALS — WEIGHT: 182.6 LBS | BODY MASS INDEX: 31.34 KG/M2

## 2021-06-02 VITALS — HEIGHT: 64 IN | BODY MASS INDEX: 27.5 KG/M2 | WEIGHT: 161.06 LBS

## 2021-06-03 NOTE — TELEPHONE ENCOUNTER
RN cannot approve Refill Request    RN can NOT refill this medication med is not covered by policy/route to provider. Last office visit: Visit date not found Last Physical: 9/28/2018 Last MTM visit: Visit date not found Last visit same specialty: 2/22/2019 Maria Alejandra Alegria MD.  Next visit within 3 mo: Visit date not found  Next physical within 3 mo: Visit date not found      Olga Hidalgo, [facility] Triage/Med Refill 11/7/2019    Requested Prescriptions   Pending Prescriptions Disp Refills     FLOVENT  mcg/actuation inhaler [Pharmacy Med Name: FLOVENT  MCG INHALER] 12 Inhaler 12     Sig: TAKE 1 PUFF BY MOUTH TWICE A DAY       There is no refill protocol information for this order

## 2021-06-04 VITALS
SYSTOLIC BLOOD PRESSURE: 102 MMHG | HEART RATE: 105 BPM | DIASTOLIC BLOOD PRESSURE: 80 MMHG | BODY MASS INDEX: 34.15 KG/M2 | WEIGHT: 200 LBS | HEIGHT: 64 IN | OXYGEN SATURATION: 98 %

## 2021-06-04 VITALS — WEIGHT: 188 LBS | BODY MASS INDEX: 32.27 KG/M2

## 2021-06-05 VITALS
HEART RATE: 109 BPM | WEIGHT: 197 LBS | DIASTOLIC BLOOD PRESSURE: 70 MMHG | BODY MASS INDEX: 33.81 KG/M2 | SYSTOLIC BLOOD PRESSURE: 100 MMHG | OXYGEN SATURATION: 98 %

## 2021-06-06 NOTE — TELEPHONE ENCOUNTER
RN cannot approve Refill Request    RN can NOT refill this medication Protocol failed and NO refill given.       Nishi Bains, [facility] Triage/Med Refill 3/16/2020    Requested Prescriptions   Pending Prescriptions Disp Refills     VENTOLIN HFA 90 mcg/actuation inhaler [Pharmacy Med Name: VENTOLIN HFA 90 MCG INHALER] 18 Inhaler 2     Sig: TAKE 2 PUFFS BY MOUTH EVERY 6 HOURS AS NEEDED FOR WHEEZE OR FOR SHORTNESS OF BREATH       Albuterol/Levalbuterol Refill Protocol Failed - 3/14/2020  4:58 PM        Failed - PCP or prescribing provider visit in last year     Last office visit with prescriber/PCP: Visit date not found OR same dept: Visit date not found OR same specialty: 2/22/2019 Maria Alejandra Alegria MD Last physical: 9/28/2018       Next appt within 3 mo: Visit date not found  Next physical within 3 mo: Visit date not found  Prescriber OR PCP: Elizabeth Barba MD  Last diagnosis associated with med order: 1. Mild intermittent asthma without complication  - VENTOLIN HFA 90 mcg/actuation inhaler [Pharmacy Med Name: VENTOLIN HFA 90 MCG INHALER]; TAKE 2 PUFFS BY MOUTH EVERY 6 HOURS AS NEEDED FOR WHEEZE OR FOR SHORTNESS OF BREATH  Dispense: 18 Inhaler; Refill: 2    If protocol passes may refill for 6 months if within 3 months of last provider visit (or a total of 9 months). If patient requesting >1 inhaler per month refill x 6 months and have patient make appointment with provider.

## 2021-06-07 NOTE — TELEPHONE ENCOUNTER
Who is calling:  Patient   Reason for Call:  Patient stated that she did go to the ED yesterday. Patient is questioning what the next step is and plan for inhaler prescription and what is the potential to return to work or stay home.   Date of last appointment with primary care: n/a  Okay to leave a detailed message: Yes  [phone]

## 2021-06-07 NOTE — TELEPHONE ENCOUNTER
Name of form/paperwork: Other:  Work Note  Date form received by clinic:  05/01/2020  When is the form needed by? ASAP  Patient Notified form requests are processed in 3-5 business days: Yes  (If patient needs for sooner, please note that in this message)  Okay to leave a detailed message: Yes     Please fax form with number listed on paperwork, send a copy to the patients My Chart and inform her when request is done.    States her employer is requesting form be faxed today.

## 2021-06-07 NOTE — TELEPHONE ENCOUNTER
Called patient. Recommend continued absence from work if still feeling shortness of breath. Continue to monitor and use rescue inhaler as needed at this juncture, no changes in plan. It will be very challenging to fill out that paperwork over the phone-based-fax system we have. We may need a different strategy (alternate provider filling it out, calling the insurance company instead of completing it, etc).

## 2021-06-07 NOTE — TELEPHONE ENCOUNTER
Upcoming Appointment Question  When is the appointment: Today  What is your appointment for?: shortness of breath symmptoms  Who is your appointment scheduled with?: was instructed to go to [facility]  What is your question/concern?: [facility] called her and told her to go to [email or web address]. Patient does not know what to do now. Please advise asap! Asking for nurse Mary Anne to please call her.  Okay to leave a detailed message?: Yes

## 2021-06-07 NOTE — TELEPHONE ENCOUNTER
Forms Request  Name of form/paperwork: ALISIA  Have you been seen for this request: No [email or web address]  Do we have the form: No The patient states she will send in My chart.   When is form needed by: 4/21/20  How would you like the form returned: My chart  Patient Notified form requests are processed in 3-5 business days: Yes    Okay to leave a detailed message? Yes    The patient states the dates the patient is out of work : 3/23/20 - 4/21/20 if significant improvement in shortness of breath. The patient was prescribed Prednisone which was started on 3/24/20 and Breo ellipta inhaler by [facility] service which the patient started on 4/3/20.

## 2021-06-07 NOTE — TELEPHONE ENCOUNTER
Called patient. She is cleared to return to work on 5/11. We will continue the breo inhaler for one more month then switch back to flovent. She tested negative for covid 19.

## 2021-06-08 NOTE — PROGRESS NOTES
"Psychology Progress Note    Date of Service:  1/3/2017  Duration:  50 minutes (9:00 AM - 9:50 AM)    Name:  Renetta Blankenship  :  1985  MRN:  [id number]    Necessity: This session is necessary to address anxiety, depressed mood, and sleep disturbance following recent concussion.    Intervention: Patient reported that she had a bad week last week, as her daughter fell ill with pneumonia and also had a seizure, and patient became ill as well. She described an incident yesterday in which she became overwhelmed by spending 4 hours at a car dealership. Writer and patient processed the event, noting the frustration that patient felt with herself for not being able to handle it as well as she would have previously. We discussed the importance of restructuring catastrophic thoughts, as she often second guesses herself and her capabilities. We continued the discussion regarding avoidance of driving, and she asserted that she feels as if this is improving. However, she indicated that she also second guesses herself and engages in catastrophic thinking regarding her ability to concentrate while driving and keep herself safe. We reviewed the importance of exposure homework (i.e., driving without engaging in avoidance behavior) and identified a goal of driving independently to her appointments in [place] this week. She was encouraged to continue gradually increasing activities and reminding herself that \"hurt does not equal harm\" (i.e., because I have a headache does not mean there is something wrong or I can't handle this).    Mental Status: Patient arrived on-time and was unaccompanied. She was casually dressed and neatly groomed. Patient appeared oriented to person, place, situation, and time, although orientation was not formally assessed. Recent and remote memory, attention, concentration, language, and fund of knowledge are generally intact and unchanged from patient's baseline. Mood was described as \"okay\" and " affect appeared congruent yet somewhat anxious. No indication of SI/HI. Patient was cooperative and pleasant throughout session. No overall changes in mental status since initial consultation.    Participation: The patient was able to participate and benefit from treatment as evidenced by her verbal expression of ideas and initiation of topics discussed.    Psychotherapeutic Techniques: Cognitive-behavioral therapy, motivational interviewing and supportive psychotherapy strategies were utilized.    Progress: Patient feels she is making adequate progress toward goals, as she is beginning to recognize triggers and impact of anxiety on postconcussive symptoms,mood, and daily functioning.This writer agrees with patient's assessment and will continue to target these symptoms in ongoing psychotherapy.    Diagnosis: Adjustment Disorder with Mixed Anxiety and Depressed Mood    Plan: Patient will return 2 weeks to continue cognitive-behavioral therapy to address anxiety, mood, and sleep. She identified a goal of driving independently to medical appointments in [place] (approximately 4-6 appointments) in the upcoming 2 weeks without engaging in avoidance behavior (e.g., having others drive her, staying off the freeway, staying home).      Provider Name:  Savanna Franks PsyD, LP  Date:  1/3/2017  Time:  9:00 AM

## 2021-06-08 NOTE — PROGRESS NOTES
"Renetta Blankenship is a 34 y.o. female who is being evaluated via a billable telephone visit.      The patient has been notified of following:     \"This telephone visit will be conducted via a call between you and your physician/provider. We have found that certain health care needs can be provided without the need for a physical exam.  This service lets us provide the care you need with a short phone conversation.  If a prescription is necessary we can send it directly to your pharmacy.  If lab work is needed we can place an order for that and you can then stop by our lab to have the test done at a later time.    Telephone visits are billed at different rates depending on your insurance coverage. During this emergency period, for some insurers they may be billed the same as an in-person visit.  Please reach out to your insurance provider with any questions.    If during the course of the call the physician/provider feels a telephone visit is not appropriate, you will not be charged for this service.\"    Patient has given verbal consent to a Telephone visit? Yes    What phone number would you like to be contacted at? [phone]    Patient would like to receive their AVS by AVS Preference: [place].    Additional provider notes: follow up    Patient calls in to discuss paperwork for her work.  Patient is concerned with her history of asthma and latent TB that she might be a greater risk for COVID-19 serious morbidity or mortality.  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  She needs a note indicating that she needs access to PPE and to help her maintain distance from other people.    In addition, patient has noticed a dramatic increase in anxiety recently.  She has been on Wellbutrin for many years and has also been on gabapentin, although she uses this to treat her headaches.  Patient is wondering what she can do to help cope as she is struggling with work and home " life.    Assessment/Plan:  1. Mild persistent asthma without complication  With respect to patient's asthma, I composed a note for work indicating that she would be at greater risk for COVID-19 infection.  I do not believe that her latent TB is likely to cause any issues as she had a recent CTA which was negative and a chest x-ray in 2018 which demonstrated no lung pathology.    2. Adjustment disorder with anxiety  Patient and I had an extensive discussion on anxiety.  I strongly recommended that patient initiate on a daily SSRI and we chose Prozac together.  The addition of hydroxyzine but instead chose to increase the available gabapentin she has throughout the day as she is already tolerating this medication.  She can go up to 300 mg 3 times daily as needed.  Finally, we discussed decreasing patient's Wellbutrin as she has had a high dose and this can worsen anxiety.  If patient does not notice any improvement in 1 week she is free to maintain her current dose of 150 mg or she returned to her previous dose of 300 mg.  - FLUoxetine (PROZAC) 10 MG capsule; Take 1 capsule (10 mg total) by mouth daily.  Dispense: 30 capsule; Refill: 2  - buPROPion (WELLBUTRIN XL) 150 MG 24 hr tablet; Take 1 tablet (150 mg total) by mouth daily.  Dispense: 180 tablet; Refill: 1    3. Post concussion syndrome  Increase gabapentin to the standard 200 mg that she takes in the morning with availability of 100 mg additional tablets to be taken throughout the day up to 3 times if she experiences worsening anxiety.  - gabapentin (NEURONTIN) 100 MG capsule; Take 200 mg by mouth daily.  Dispense: 90 capsule; Refill: 0        Phone call duration:  25 minutes    Elizabeth Barba MD

## 2021-06-08 NOTE — ANESTHESIA PREPROCEDURE EVALUATION
Anesthesia Evaluation      Patient summary reviewed   No history of anesthetic complications     Airway   Mallampati: II  Neck ROM: full   Pulmonary - normal exam   (+) asthma                           Cardiovascular - negative ROS and normal exam   Neuro/Psych      Endo/Other - negative ROS      GI/Hepatic/Renal - negative ROS      Other findings: Concussion hx  Chronic HA      Dental - normal exam                        Anesthesia Plan  Planned anesthetic: epidural  Anesthesia Labor Epidural Note    Medical and surgical history reviewed.  Patient examined.  Alternative, benefits, and risks of labor epidural discussed including the risks of epidural abscess and hematoma, temporary or permanent nerve injury, seizure, cardiopulmonary arrest, and  and maternal complications to include death.  All questions answered.  The patient agreed to proceed given the risk.  Procedural pause completed prior to the commencement of the procedure.    A/P: Patient is  a satisfactory candidate for epidural analgesia for labor.  Will follow.  ASA 2     Anesthetic plan and risks discussed with: patient and spouse    Post-op plan: routine recovery

## 2021-06-08 NOTE — ANESTHESIA POSTPROCEDURE EVALUATION
Patient: Renetta Blankenship  Labor Epidural  Anesthesia type: regional    Patient location: PACU  Last vitals:   Vitals:    02/04/17 0050   BP: 113/60   Pulse: 77   Resp:    Temp:    SpO2:      Post vital signs: stable  Level of consciousness: awake and responds to simple questions  Post-anesthesia pain: pain controlled  Post-anesthesia nausea and vomiting: no  Pulmonary: unassisted, return to baseline  Cardiovascular: stable and blood pressure at baseline  Hydration: adequate  Anesthetic events: no    QCDR Measures:  ASA# 11 - Louise-op Cardiac Arrest: ASA11B - Patient did NOT experience unanticipated cardiac arrest  ASA# 12 - Louise-op Mortality Rate: ASA12B - Patient did NOT die  ASA# 13 - PACU Re-Intubation Rate: NA - No ETT / LMA used for case  ASA# 10 - Composite Anes Safety: ASA10A - No serious adverse event  ASA# 38 - New Corneal Injury: ASA38A - No new exposure keratitis or corneal abrasion in PACU    Additional Notes:

## 2021-06-08 NOTE — ANESTHESIA PROCEDURE NOTES
Epidural Block    Patient location during procedure: OB  Time Called: 2/3/2017 9:05 PM  Reason for Block:labor epidural  Staffing:  Performing  Anesthesiologist: JET GANNON  Preanesthetic Checklist  Completed: patient identified, risks, benefits, and alternatives discussed, timeout performed, consent obtained, at patient's request, airway assessed, oxygen available, suction available, emergency drugs available and hand hygiene performed  Procedure  Patient position: sitting  Prep: ChloraPrep  Patient monitoring: continuous pulse oximetry, heart rate and blood pressure  Approach: midline  Location: L2-L3  Injection technique: GAYATHRI saline  Number of Attempts:1  Needle  Needle type: Nolan   Needle gauge: 18 G     Catheter in Space: 5  Assessment  Sensory level:  No complications

## 2021-06-08 NOTE — PROGRESS NOTES
Patient presents to hospital with c/o contractions every 5-7 min. She states they began overnight last night and have progressively come closer together through the day. She has had bloody show today and denies LOF. SVE at 1930 is 2/60/-2, which is unchanged from her clinic check last week. She is currently ainsley every 8-13 min, palpating mild. Patient is up walking and using the birthing ball. Dr. Torres, in house OB, updated. Will recheck cervix at 2130.
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
## 2021-06-08 NOTE — PROGRESS NOTES
Patient walked for two hours and she reports her contractions spacing out. SVE unchanged. Discharge orders received.

## 2021-06-08 NOTE — PROGRESS NOTES
[facility] Daily Progress     Patient Name: Renetta Blankenship  Date: 2017  Visit #: 6  Referral Diagnosis: Pelvic pain  Referring provider: Luca Zhang MD  Visit Diagnosis:     ICD-10-CM    1. Pain in joint, pelvic region and thigh, right M25.551    2. SI (sacroiliac) joint dysfunction M53.3    3. Muscle weakness (generalized) M62.81          Assessment:     Instability continues into the pelvis. Most likely d/t ligament laxity in her pregnancy.  HEP/POC compliance is  good .  Patient is appropriate to continue with skilled physical therapy intervention, as indicated by initial plan of care.    Goal Status:  Pt. will demonstrate/verbalize independence in self-management of condition in : 12 weeks;Progressing toward  Pt. will be independent with home exercise program in : 12 weeks;Progressing toward  Patient will ascend / descend: stairs;step;curb;with less pain;in 12 weeks;Progressing toward  Patient will transfer: sit/stand;supine/sit;floor/stand;for car;for toileting;for in/out of bed;for in/out of chair;with less pain;with less difficulty;in 12 weeks;Progressing toward  No Data Recorded    Plan / Patient Education:     Continue with initial plan of care.  Progress with home program as tolerated.    Subjective:     Pain Ratin  Pt conts to report some pain. Pt is having most difficulties with rolling in bed and getting out of bed at this time. Pt is about 36 wks gestation at this time. Pt notes she conts to measure about 3 wks early. Pt is also noting some contractions at this time.        Objective:     Pt with increased lordosis and kyphosis as her pregnancy progresses. Baby appears to be sitting low in her pelvis.  Pt with mod myofascial tone and tightness into the lumbosacral region and into the thoracic renetta. Tenderness noted over the R SI jt. A mod R ant ilial rot is noted. Pubes appear level with mod tenderness noted. TP noted R piriformis.    Treatment Today     TREATMENT MINUTES  COMMENTS   Evaluation     Self-care/ Home management     Manual therapy 45 Reassessed the pelvis, STM/MFR x 35' to lumbosacral region in L side lying, Gr I gentle mobs to L spine in L side lying x 5' Shotgun x1, MET to correct R ant ilial rot level after 1 attempts   Neuromuscular Re-education     Therapeutic Activity     Therapeutic Exercises 5 Verbal review HEP   Gait training     Modality__________________                Total 50    Blank areas are intentional and mean the treatment did not include these items.       Mili Garduno  PT  1/11/2017

## 2021-06-08 NOTE — PROGRESS NOTES
"Renetta Blankenship is a 34 y.o. female who is being evaluated via a billable telephone visit.      The patient has been notified of following:     \"This telephone visit will be conducted via a call between you and your physician/provider. We have found that certain health care needs can be provided without the need for a physical exam.  This service lets us provide the care you need with a short phone conversation.  If a prescription is necessary we can send it directly to your pharmacy.  If lab work is needed we can place an order for that and you can then stop by our lab to have the test done at a later time.    Telephone visits are billed at different rates depending on your insurance coverage. During this emergency period, for some insurers they may be billed the same as an in-person visit.  Please reach out to your insurance provider with any questions.    If during the course of the call the physician/provider feels a telephone visit is not appropriate, you will not be charged for this service.\"    Patient has given verbal consent to a Telephone visit? Yes    What phone number would you like to be contacted at? [phone]    Patient would like to receive their AVS by AVS Preference: Garrett.    Additional provider notes: work restrictions    Patient and I have had numerous communications and appointments with respect to managing her ability to work.  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  Patient is unable to request that her patients wear a mask as the protocol indicates that no homemade masks can be used given the risk of metal.    Patient has been in an ongoing discussion with management and attempt to feel safe while still performing her job duties.  She is willing to go on an extended leave but prefer to work.  She would feel comfortable working if she had an N 95 mask is properly fit tested and a face shield which is recommended by the CDC.    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    Assessment/Plan:  1. Mild persistent asthma without complication  Given patient's underlying medical respiratory history of asthma, she is at greater risk should she become exposed to COVID-19.  We compose a letter to her work requesting that she be given the appropriate PPE in order to make her feel safe while she is performing her duties.        Phone call duration:  15 minutes    Elizabeth Barba MD

## 2021-06-08 NOTE — TELEPHONE ENCOUNTER
RN cannot approve Refill Request    RN can NOT refill this medication Protocol failed and NO refill given. Last office visit: Visit date not found Last Physical: 9/28/2018 Last MTM visit: Visit date not found Last visit same specialty: 2/22/2019 Maria Alejandra Alegria MD.  Next visit within 3 mo: Visit date not found  Next physical within 3 mo: Visit date not found      Christina Figueroa, [facility] Triage/Med Refill 5/24/2020    Requested Prescriptions   Pending Prescriptions Disp Refills     BREO ELLIPTA 200-25 mcg/dose DsDv inhaler [Pharmacy Med Name: BREO ELLIPTA 200-25 MCG INH]  0     Sig: TAKE 1 PUFF BY MOUTH EVERY DAY       There is no refill protocol information for this order
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
## 2021-06-08 NOTE — PROGRESS NOTES
Occupational Therapy    OCCUPATIONAL THERAPY TREATMENT SESSION      Rx Units: 4 sensory    Total Minutes: 55 minutes  Treatment #: 3/3  Insurance Carrier: auto    Medical Diagnosis: Post Concussion         Treatment Dx: visual disturbance  Referring MD: VENKATESH Collado  Onset date: 10/25/16    Start of Care: 11/16/16         Pain rating: 3/10  Location: HA       SUBJECTIVE:    Drove self to appoint this date and reports doing more driving over the last few days. Pt arrived on time and in good spirits. She reports her vision HEP are going well and has increased challenge of them.  She feels driving has been going ok, difficulty with divided attention and multi tasking. Driving at night still a challenge. She notes; More mental fatigue/fogginess vs visual fatigue. She has added more natural at home and less dark rooms. Has not been wearing sunglasses as often. Tolerating the light much better. When asked about watching tv with shows with lots of movement, she reports this to be better than previous weeks. She is managing home task well- doing more. Still incorp breaks for visual/mental. Computer time is about 10 minutes before needing a break- brightness is dimmed, no overlays used. Has been using overlay as needed with reading(magazine) - She feels her struggle is more mentl fatigue/attention vs ocular muscle fatigue.                      Vision Issues: light sensitivity, eye strain  Other information/data: dtg was/she was sick, and lots of activity over break with everyone home. She took some lights out of dinning room light- this helped a great deal in being able to tolerate sitting under the hanging light. She has not gone back to work. Still trying to find balance. Feels she needing to focus more to tune out extraneous noise to be able to concentrate on one thingother. Multiple distraction to focus on one thing.        OBJECTIVE:       Completed shifting focus exerc with metronome 70 BPM- initially  burning/itchty-blinking eyes and did get better. Completed x3-1 minute intervals. More work shifting R-L. Completed at 10 feet and 4 feet.  Some strain on R around/side by eye. Rest break in dimmed room to manage HA and ocular muscle fatigue-short period only. Completed scanning tasks; Paper/pen number spread sheet crossing out '3'- not timed/ 100 % acc. Object search paper pen task- 100%- no c/o of ocular fatigue. More attention strain per pt.     Convergence retest: 10/11 cm, L visual field- 11 cm, R visual field 14 cm.       Patients response to session tasks: tolerated well. Needing short breaks to manage mental fatigue and eye strain. No change in HA      ASSESSMENT:  Met goals. Reviewed concussion recovery, mental fiatgue strategy, suggested continued vision HEP/balance and challenging self. Pt comfotable with /dc OT and understands goals met at this time and if drastic changes accur can request re-eval.         Prognostic Indicators: Rehabilitation potential: Excellent      Impairment: Headache (resovling), light sensitivity (reloving), decreased convergence(resovling)      Progress towards goals: Goals met      Functional Goals: to be met by 1/7/17     1. Pt to verbalize understanding of concussion vision recovery process and education of vision/visual skills.- met  2. Pt to verbalize understanding of environmental adaption's for vision during daily tasks/work for increased success.- met  3. Pt to demo increase of convergence by >20 cm for daily task success.- met  4. Pt demo/verb understanding of vision HEP and recommendations for visual disturbances.- met          Plan of Care: Paitent/Family Instruction: Treatment plan/rationale, home exercise program, vision HEP program, convergence retesting/exercise/activies, and vision activities      Frequency / Duration: no further outpt at this time    Signature: Ronnie Tomas, OTRL/CBIS 1/5/17      Physician Recommendation:  1. I certify the need for these  services furnished within this plan and while under my care. I agree with the therapist's recommendation for plan of care.    2. If there is any recommendation for modification of therapy plan, please indicate below.
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
## 2021-06-08 NOTE — PROGRESS NOTES
Assessment/Plan:     1. Sinusitis  Supportive care discussed.  Okay to take plain Mucinex without decongestant while pregnant.  Medications sent to pharmacy.  Call return to care if symptoms worsen or do not improve  - amoxicillin (AMOXIL) 500 MG capsule; Take 1 capsule (500 mg total) by mouth 2 (two) times a day for 10 days.  Dispense: 20 capsule; Refill: 0    2. Mild intermittent asthma without complication  Patient states she has had mild flareups with recent illnesses but otherwise asthma has been well controlled with recent addition of Flovent.        Subjective:      Renetta Blankenship is a 31 y.o. female comes in today primarily for concerns over sinus infection.  She states that on December 31 she was ill and was seen at an urgent care and diagnosed with strep throat sinus infection and ear infection and pneumonia and was given 10 days of amoxicillin.  She states that she felt better afterwards.  Her daughter is also ill with similar.  Then about 5 days ago she started feeling sick again.  She states that her primary concern is significant facial pressure and pain.  She has not had any fevers or chills but does feel a little warm.  She feels a mild sore throat but thinks it is more postnasal drip.  She has had a bit of a cough but no significant shortness of breath.  She has been using her asthma inhalers that we recently prescribed to her at last visit and feels that they work well.  She was seen on urgent care 2 days ago had a negative flu and strep test.  I was able to review those records via care everywhere.  They did diagnose her with possible pinkeye on the right.  Patient states that it has been very itchy and she has thick discharge especially in the morning.  They gave her ofloxacin drops but she has not started them yet.  She states that the facial pressure was so uncomfortable that she could not sleep last night.  He is having thick green yellow discharge coming from her nose and throat.  She is  currently 36 weeks pregnant.  She has no other new concerns today.    Current Outpatient Prescriptions   Medication Sig Dispense Refill     acetaminophen (TYLENOL) 325 MG tablet Take 325-650 mg by mouth every 4 (four) hours as needed for pain.       albuterol (PROVENTIL HFA;VENTOLIN HFA) 90 mcg/actuation inhaler Inhale 2 puffs every 6 (six) hours as needed for wheezing or shortness of breath. 1 Inhaler 6     ascorbic acid, vitamin C, (ASCORBIC ACID WITH SAMANTHA HIPS) 500 MG tablet Take 500 mg by mouth daily.       fluticasone (FLOVENT HFA) 110 mcg/actuation inhaler Inhale 1 puff 2 (two) times a day. 1 Inhaler 12     ofloxacin (OCUFLOX) 0.3 % ophthalmic solution Use 1 drop to the affected eye three times a day for 5 days       omega-3 fatty acids-vitamin E (FISH OIL) 1,000 mg cap Take 1 capsule by mouth daily.       prenatal vitamin iron-folic acid 27mg-0.8mg (PRENATAL S) 27 mg iron- 800 mcg Tab tablet Take 1 tablet by mouth daily.       amoxicillin (AMOXIL) 500 MG capsule Take 1 capsule (500 mg total) by mouth 2 (two) times a day for 10 days. 20 capsule 0     No current facility-administered medications for this visit.        Past Medical History, Family History, and Social History reviewed.  Past Medical History   Diagnosis Date     Asthma      Uses inhaler twice a week;     Asthma      No past surgical history on file.  Bacitracin  Family History   Problem Relation Age of Onset     Depression Mother      Hypertension Mother      Early death Father      MVA     No Medical Problems Sister      No Medical Problems Brother      Breast cancer Maternal Aunt      Cancer Maternal Grandmother      LUNG     Social History     Social History     Marital status:      Spouse name: N/A     Number of children: N/A     Years of education: N/A     Occupational History     Not on file.     Social History Main Topics     Smoking status: Former Smoker     Years: 2.00     Smokeless tobacco: Never Used     Alcohol use No     Drug  "use: No     Sexual activity: Yes     Partners: Male     Other Topics Concern     Not on file     Social History Narrative         Review of systems is as stated in HPI, and the remainder of the 10 system review is otherwise negative.    Objective:     Vitals:    01/18/17 0704   BP: 114/72   Pulse: (!) 109   Temp: 97.7  F (36.5  C)   TempSrc: Oral   SpO2: 97%   Weight: 213 lb (96.6 kg)   Height: 5' 4\" (1.626 m)    Body mass index is 36.56 kg/(m^2).    General Appearance:    Alert, cooperative, no distress, appears stated age   Head:    Normocephalic, without obvious abnormality, atraumatic   Eyes:    PERRL, EOM's intact, the left eye is clear, the lower conjunctiva of right eye is mildly erythematous.  No discharge today in office    Ears:    Normal TM's and external ear canals   Nose:   Mucosa normal, moderate sinus tenderness throughout    Throat:  mildly erythematous posterior oropharynx but otherwise oropharynx is clear   Neck:   Supple, symmetrical, no adenopathy, no thyromegally       Lungs:     Clear to auscultation bilaterally, respirations unlabored        Heart:    Regular rate and rhythm, S1 and S2 normal, no murmur, rub    or gallop       Abdomen:    gravid            Extremities:  ambulating normally        Skin:   No rashes or lesions         This note has been dictated using voice recognition software. Any grammatical or context distortions are unintentional and inherent to the the software.   "

## 2021-06-08 NOTE — PROGRESS NOTES
Physical Therapy  Physical Therapy Daily Outpatient Documentation      Rx Units: 2- TE, 1- MT    Total OP Minutes: 45  Treatment #: 4/10 - AUTO    Pain: 3/10, 3-4/10  Location: dull headache, neck pain  Intervention: see below, rest    Subjective: Patient states that last week was stressful. Her daughter has pneumonia and had a seizure due to a high fever.  They went ER and her daughter had been home from  for about a week. Last week both her daughter and  were home and she was not allowed as much rest time. In addition, the patient became ill as well.  Her headaches have improved however these continue to be limited by stress and tension. In general, the frequency and intensity have reduced. Her neck pain is triggered by increased tension and driving.    Therapeutic Exercise  Total Minutes: 30    - NDI score: 54% (27/50)  - Side lying scapular retraction/depression x 10 reps B - good form  - Side lying scapular retraction/depression with shoulder ER x 10 reps B with 2# weight. - decreased strength on R compared to L leading to increased mm fatigue  - Seated chin tucks x 10 reps with 5 sec holds - Hand in L position to provide feedback.  Good use of DNFs and minor VC for duration of hold.   - Seated upper trap stretch x 30 seconds x 2 reps B - VC for hand placement  - Seated levator scapula stretch x 30 seconds x 2 reps B - VC for proper form  - **Seated SCM/scalene stretch x 30 seconds x 2 B - VC for proper form  - Standing scap set with yellow theraband x 10 rep - minor cues for increased scapular depression as reps progressed. Pt instructed to perform 5-6 reps x 2 sets at home in order to decrease overuse of upper traps    -Cervical AROM   Flexion: 39 degrees         Extension: 36 degrees          Right Rotation: 68 degrees        Left Rotation: 67 degrees        Right Side Bendin degrees        Left Side Bendin degrees      Current HEP:  -Seated chin tucks  -Side lying scap  sets  -standing scap set with yellow Tband   -UT stretch  -Levator scap stretch  -Scalene stretch  -suboccipital release      Manual Therapy  Total Minutes: 15    -Cervical/scapular soft tissue assessment: hypertonicity at suboccipitals (R>L), R>L levator scapula, R paraspinals   -Cervical distraction x 1 minute x 2 reps  -Suboccipital release x 2 minutes x 2 reps  -Soft tissue manipulation to R suboccipitals, levator scapular (R>L) with trigger point release to 2 locations on R x 45 seconds each; STM to R cervical paraspinals  -Manual stretch to R upper trap and R levator scapular for 20 seconds x 1 rep each        Assessment/Plan for week of 1/2/2017-1/6/2017:  Patient's muscle tone continues to improve despite decreased compliance with her HEP. She is moving towards self management and only requires minor cues for proper form when performing strengthening and stretching exercises.  Her score on the Neck Disability Index was 54% which places her in the severe disability, however this is an improvement from 70% (complete disability) upon initial assessment. Ongoing neck pain, may be multifactorial as her body continues to change with her pregnancy and she has been using more accessory muscles to assist with breathing which can lead to increased muscle tension.  Plan to follow up with patient in 2 weeks to assess HEP, address questions and discharge at that time.         Additional Notes:  PT goals:  Pt will...  1. Decrease NDI score by 10% in order to ease ADLs and IADLs. - MET (1/3/2017)  2. Demonstrate cervical AROM WFL in order to check blind spots while driving  3. Score >30 seconds in each condition of the mCTSIB in order to improve balance.  - MET (12/13/2016)  4. Be independent with a HEP to self manage symptoms
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
## 2021-06-08 NOTE — PROGRESS NOTES
How have you been doing since we last saw you? any concerns?( new pt. Ask how concussion happen?) f/u appt wants talk about work restriction ,pt thinks she can work through without breaks. Also wants to talk about after care of pregnancy       Current Symptoms : Yes ongoing headaches

## 2021-06-08 NOTE — PROGRESS NOTES
[facility] Daily Progress     Patient Name: Renetta Blankenship  Date: 2017  Visit #: 5  Referral Diagnosis: Pelvic pain  Referring provider: Luca Zhang MD  Visit Diagnosis:     ICD-10-CM    1. Pain in joint, pelvic region and thigh, right M25.551    2. SI (sacroiliac) joint dysfunction M53.3    3. Muscle weakness (generalized) M62.81          Assessment:     Instability continues into the pelvis. Most likely d/t ligament laxity in her pregnancy.  HEP/POC compliance is  good .  Patient is appropriate to continue with skilled physical therapy intervention, as indicated by initial plan of care.    Goal Status:  Pt. will demonstrate/verbalize independence in self-management of condition in : 12 weeks;Progressing toward  Pt. will be independent with home exercise program in : 12 weeks;Progressing toward  Patient will ascend / descend: stairs;step;curb;with less pain;in 12 weeks;Progressing toward  Patient will transfer: sit/stand;supine/sit;floor/stand;for car;for toileting;for in/out of bed;for in/out of chair;with less pain;with less difficulty;in 12 weeks;Progressing toward  No Data Recorded    Plan / Patient Education:     Continue with initial plan of care.  Progress with home program as tolerated.    Subjective:     Pain Ratin  Pt conts to report some pain.  Pt now with dx of pneumonia and strep and is being treated with antibiotics. Pt states she has been lifting her daughter a lot in the last week d/t the child is ill.       Objective:     Pt with mod myofascial tone and tightness into the lumbosacral region and into the thoracic renetta. Tenderness noted over the R SI jt. A mod R ant ilial rot is noted. Pubes appear level with slight tenderness noted. TP noted R piriformis.    Treatment Today     TREATMENT MINUTES COMMENTS   Evaluation     Self-care/ Home management     Manual therapy 45 Reassessed the pelvis, STM/MFR x 35' to lumbosacral region in L side lying, Gr I gentle mobs to L spine in L  side lying x 5'  MET to correct R ant ilial rot level after 1 attempts   Neuromuscular Re-education     Therapeutic Activity     Therapeutic Exercises 5 Verbal review HEP   Gait training     Modality__________________                Total 50    Blank areas are intentional and mean the treatment did not include these items.       Mili Garduno  PT  1/4/2017

## 2021-06-08 NOTE — PROGRESS NOTES
[facility] Discharge Summary  Patient Name: Renetta Blankenship  Date: 3/28/2017  Referral Diagnosis: Pelvic pain  Referring provider: Luca Zhang MD  Visit Diagnosis:   1. Pain in joint, pelvic region and thigh, right     2. SI (sacroiliac) joint dysfunction     3. Muscle weakness (generalized)         Goals:  Pt. will demonstrate/verbalize independence in self-management of condition in : 12 weeks;Progressing toward  Pt. will be independent with home exercise program in : 12 weeks;Progressing toward  Patient will ascend / descend: stairs;step;curb;with less pain;in 12 weeks;Progressing toward  Patient will transfer: sit/stand;supine/sit;floor/stand;for car;for toileting;for in/out of bed;for in/out of chair;with less pain;with less difficulty;in 12 weeks;Progressing toward  No Data Recorded    Patient was seen for 8 visits from 12/6/16 to 1/25/17 with 1 missed appointments.  The patient attended therapy initially, but did not finish the therapy sessions prescribed.  Goals were not fully achieved.  Pt has not returned to PT and will be discharged at this time.  Please see note below for last data.    Therapy will be discontinued at this time.  The patient will need a new referral to resume.    Thank you for your referral.  Mili Garduno PT  3/28/2017  3:03 PM    [facility] Daily Progress     Patient Name: Renetta Blankenship  Date: 1/25/2017  Visit #: 8  Referral Diagnosis: Pelvic pain  Referring provider: Luca Zhang MD  Visit Diagnosis:     ICD-10-CM    1. Pain in joint, pelvic region and thigh, right M25.551    2. SI (sacroiliac) joint dysfunction M53.3    3. Muscle weakness (generalized) M62.81          Assessment:     Instability continues into the pelvis. Most likely d/t ligament laxity in her pregnancy.  HEP/POC compliance is  good .  Patient is appropriate to continue with skilled physical therapy intervention, as indicated by initial plan of care.    Goal Status:  Pt. will  demonstrate/verbalize independence in self-management of condition in : 12 weeks;Progressing toward  Pt. will be independent with home exercise program in : 12 weeks;Progressing toward  Patient will ascend / descend: stairs;step;curb;with less pain;in 12 weeks;Progressing toward  Patient will transfer: sit/stand;supine/sit;floor/stand;for car;for toileting;for in/out of bed;for in/out of chair;with less pain;with less difficulty;in 12 weeks;Progressing toward  No Data Recorded    Plan / Patient Education:     Continue with initial plan of care.  Progress with home program as tolerated.    Subjective:     Pain Rating: 3-4  Pt reports she did goback to work this wk 4hr x 2 days. Pt reports some increased pain with standing longer pds. Pt actually noted more issues with her 40 min drive to and from work.  Pt did see OB today and did state the pt is 3 cm dilated today.        Objective:     Pt with increased lordosis and kyphosis as her pregnancy progresses. Baby appears to be sitting low in her pelvis.  Pt with mod myofascial tone and tightness into the lumbosacral region and into the thoracic renetta. Tenderness noted over the R SI jt. A min-mod R ant ilial rot is noted. Pubes appear level with mod tenderness noted. TP noted R piriformis.    Treatment Today     TREATMENT MINUTES COMMENTS   Evaluation     Self-care/ Home management     Manual therapy 45 Reassessed the pelvis, STM/MFR x 35' to lumbosacral region in L side lying, Gr I gentle mobs to L spine in L side lying x 5' Shotgun x1, MET to correct R ant ilial rot level after 1 attempts   Neuromuscular Re-education     Therapeutic Activity     Therapeutic Exercises 5 Verbal review HEP   Gait training     Modality__________________                Total 50    Blank areas are intentional and mean the treatment did not include these items.       Mili Garduno  PT  1/25/2017

## 2021-06-08 NOTE — TELEPHONE ENCOUNTER
Question following Office Visit  When did you see your provider: Today  What is your question: Patient stated she has a question on the letter. Patient is asking if the part about Dr. Justice recommending the patient to be self-quarantined, can the recommendation wording be changed? Patient stated if she submits this letter based on that wording, then her employer will view this as an optional choice and not a definitive choice for the patient to self-quarantine.    Please let her know if the letter can be re-written.  Okay to leave a detailed message: Yes [phone]

## 2021-06-08 NOTE — PROGRESS NOTES
[facility] Daily Progress     Patient Name: Renetta Blankenship  Date: 1/18/2017  Visit #: 7  Referral Diagnosis: Pelvic pain  Referring provider: Luca Zhang MD  Visit Diagnosis:     ICD-10-CM    1. Pain in joint, pelvic region and thigh, right M25.551    2. SI (sacroiliac) joint dysfunction M53.3    3. Muscle weakness (generalized) M62.81          Assessment:     Instability continues into the pelvis. Most likely d/t ligament laxity in her pregnancy.  HEP/POC compliance is  good .  Patient is appropriate to continue with skilled physical therapy intervention, as indicated by initial plan of care.    Goal Status:  Pt. will demonstrate/verbalize independence in self-management of condition in : 12 weeks;Progressing toward  Pt. will be independent with home exercise program in : 12 weeks;Progressing toward  Patient will ascend / descend: stairs;step;curb;with less pain;in 12 weeks;Progressing toward  Patient will transfer: sit/stand;supine/sit;floor/stand;for car;for toileting;for in/out of bed;for in/out of chair;with less pain;with less difficulty;in 12 weeks;Progressing toward  No Data Recorded    Plan / Patient Education:     Continue with initial plan of care.  Progress with home program as tolerated.    Subjective:     Pain Rating: 3-4  Pt conts to have some pain at times. Pt reports she will go back to work next wk 4hr x 2 days next wk.          Objective:     Pt with increased lordosis and kyphosis as her pregnancy progresses. Baby appears to be sitting low in her pelvis.  Pt with mod myofascial tone and tightness into the lumbosacral region and into the thoracic renetta. Tenderness noted over the R SI jt. A mod R ant ilial rot is noted. Pubes appear level with mod tenderness noted. TP noted R piriformis.    Treatment Today     TREATMENT MINUTES COMMENTS   Evaluation     Self-care/ Home management     Manual therapy 45 Reassessed the pelvis, STM/MFR x 35' to lumbosacral region in L side lying, Gr I  gentle mobs to L spine in L side lying x 5' Shotgun x1, MET to correct R ant ilial rot level after 1 attempts   Neuromuscular Re-education     Therapeutic Activity     Therapeutic Exercises 5 Verbal review HEP   Gait training     Modality__________________                Total 50    Blank areas are intentional and mean the treatment did not include these items.       Mili Garduno  PT  1/18/2017

## 2021-06-08 NOTE — PROGRESS NOTES
[facility] Concussion-Vision Discharge      Start of Care: 11/16/16  Date of Discharge: 1/5/17  Number (#) of sessions attended: 3/3  Insurance Carrier: auto    Refer to daily documentation flowsheet for equipment issued.     See initial evaulation for goals and POC  Goals: Met    D/C Summary:  Reviewed concussion recovery, mental fatigue strategy, suggested continued vision HEP/balance and challenging self. Pt comfortable with /dc OT and understands goals met at this time and if drastic changes accur can request     Ronnie Tomas OTRL/ROSANNE 1/12/17
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
## 2021-06-09 NOTE — TELEPHONE ENCOUNTER
Left message to call back for: pt/Renetta  Information to relay to patient:  LDM for pt/Renetta to call bk to schedule a physical with Dr Elizabeth Barba
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
## 2021-06-09 NOTE — PROGRESS NOTES
How have you been doing since we last saw you? any concerns?( new pt. Ask how concussion happen?) pt stated that her headaches have gotten worse and the concentration is still not better.       Current Symptoms : Yes
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
## 2021-06-09 NOTE — TELEPHONE ENCOUNTER
Left message to call back for: pt  Information to relay to patient:  2nd message to call and schedule physical with  Dr frey.
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
## 2021-06-09 NOTE — PROGRESS NOTES
"Psychology Progress Note    Date of Service:  3/15/2017  Duration:  50 minutes (11:00 AM - 11:50 AM)    Name:  Renetta Blankenship  :  1985  MRN:  [id number]    Necessity: This session is necessary to address anxiety, depressed mood, and interpersonal difficulties.    Intervention: Patient had not been seen for several weeks, as she recently gave birth to her son. She reestablished care with this writer on this date. She noted that she is feeling overwhelmed by current circumstances, as she is returning to work within the next couple of weeks despite not feeling ready. She stated that she will be able to take an additional 5 weeks of maternity leave later in the year (e.g., summer), but that she feels guilty for not spending more time with her  son. She reported that her postconcussive symptoms (i.e., headache) have increased, and she feels as if she has taken \"5 steps backwards.\" Writer emphasized the importance of not engaging in catastrophic thinking as well as restructuring maladaptive thoughts. We reviewed the cycle of anxiety/avoidance and the situations in which this plays out. Writer and patient also discussed the notion of control and the role it currently plays in her life. She stated that she feels as if she has a lack of control in many areas, which causes her to assert it in whatever way she can. Writer utilized tug-of-war metaphor, noting the importance of letting go. Patient agreed, asserting that she would be able to feel more productive if she were able to better care for herself. Additionally, she agrees that returning to work and gaining structure and purpose will likely aid in her recovery. Patient and writer agreed to continue meeting on a regular basis (approximately every 2 weeks) to process frustration, anxiety/stress, and depressed mood, particularly as she transitions back to work.    Mental Status: Patient arrived on-time and was accompanied by her 6-week-old son. She was " "casually dressed and neatly groomed. Patient appeared oriented to person, place, situation, and time, although orientation was not formally assessed. Recent and remote memory, attention, concentration, language, and fund of knowledge are generally intact and unchanged from patient's baseline. Mood was described as \"okay\" and affect appeared congruent yet anxious and depressed. No indication of SI/HI. Patient was cooperative and pleasant throughout session. No overall changes in mental status since initial consultation.    Participation: The patient was able to participate and benefit from treatment as evidenced by her verbal expression of ideas and initiation of topics discussed.    Psychotherapeutic Techniques: Cognitive-behavioral therapy, motivational interviewing and supportive psychotherapy strategies were utilized.    Progress: Patient feels she is \"taking steps backwards,\" although is able to recognize the catastrophic nature in this thinking. She expressed her motivation toward goals of feeling better and managing anxiety and mood. This writer will help to support such goals in ongoing psychotherapy. We resigned/updated treatment plan on this date.    Diagnosis: Adjustment Disorder with Mixed Anxiety and Depressed Mood    Plan: Patient will return in 2 weeks to continue cognitive-behavioral therapy to address anxiety, mood, and interpersonal difficulties.       Provider Name:  Savanna Franks PsyD, RICHAR  Date:  3/15/2017  Time:  11:00 AM        "
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
## 2021-06-10 NOTE — PATIENT INSTRUCTIONS - HE
Birth control: Nexplanon  What is the Birth Control Implant?  The birth control implant is a thin, flexible plastic implant about the size of a cardboard matchstick. It is inserted under the skin of the upper arm. It protects against pregnancy for up to three years. The implant is available under the brand names Implanon and Nexplanon.      How Does the Implant Work?  Like several other methods of birth control, such as the birth control shot, the birth control implant releases a hormone -- progestin. Hormones are chemicals made in our bodies. They control how different parts of our bodies work.  The progestin in the birth control implant works by keeping eggs from leaving the ovaries. Pregnancy cannot happen if there is no egg to join with the sperm.  making a woman's cervical mucus thicker. This keeps sperm from getting to the eggs.      How Effective is the Birth Control Implant?  Effectiveness is an important and common concern when choosing a birth control method. The birth control implant is very effective. Less than 1 out of 100 women a year will become pregnant using the implant. It lasts up to three years.  Certain medicines and supplements may make the birth control implant less effective. These include:  certain TB medicines  certain medicines that are taken by mouth for yeast infections  certain HIV medicines  certain anti-seizure medicines  certain mental disorder medicines  herbals like Mely's wort  Keep in mind Implanon doesn't protect against sexually transmitted diseases. Use a latex or female condom to reduce the risk of infection.  How Safe is the Birth Control Implant?  Most women can use the birth control implant safely. But all medications have some risks, so safety is a concern when choosing a birth control method. Talk with your health care provider about your health and whether the implant is likely to be safe for you. You should not use the implant if you are pregnant or have breast  cancer.  There are many other methods of birth control that may be safe for you if you cannot use the birth control implant. Read about other methods to find one that may be right for you.  What Are the Benefits of the Birth Control Implant?  Using the birth control implant is safe, simple, and convenient. Women like the implant because  * The ability to become pregnant returns quickly when you stop using the implant.  * It can be used while breastfeeding.  * It can be used by women who cannot take estrogen.  * It gives continuous long-lasting birth control without sterilization.  * There is no medicine to take every day.  * Nothing needs to be put in place before vaginal intercourse.  * Some women may have undesirable side effects while using the birth control implant. But many women adjust to it with few or no problems.  *The implant cannot be used by women who have breast cancer.*      What are the side effects of the Implant?  Irregular bleeding is the most common side effect, especially in the first 6-12 months of use.  For most women, periods become fewer and lighter. After one year, 1 out of 3 women who use the birth control implant will stop having periods completely.  Some women have longer, heavier periods.  Some women have increased spotting and light bleeding between periods.  These side effects are completely normal. Some woman may worry that they are pregnant if they do not have a regular period. But when the implant is used correctly, it is very effective. If you are concerned about a possible pregnancy, you can always take a pregnancy test.  Less common side effects of Implanon include:  change in sex drive  discoloring or scarring of the skin over the implant  headache  rarely, an infection or pain in the arm  nausea  pain at the insertion site  sore breasts  weight gain  Serious Side Effects of the Birth Control Implant  Serious problems usually have warning signs. Tell your health care provider  "immediately if  You have bleeding, pus, or increasing redness, or pain at insertion site.  You have a new lump in your breast.  You have no period after having a period every month.  yellowing of the skin or eyes  You have unusually heavy or prolonged bleeding from your vagina.  The implant comes out or you have concerns about its location.      How Is the Birth Control Implant Inserted and Removed?  After taking your medical history and giving you a physical exam, your health care provider will numb a small area of your arm with a painkiller. The birth control implant is inserted under the skin. Insertion takes only a few minutes.  After insertion, be sure to tell any health care provider you may see that you are using the birth control implant.  The implant is effective for three years after it is inserted. After that, it should be removed. Even though it stops working, it may interfere with your period.  The implant can be removed at any time. Your health care provider will numb the area with a painkiller and will usually make one small cut to remove the implant. Removal usually takes just a few minutes, but it generally takes longer than insertion. A new implant may be inserted at this time. Pregnancy can happen anytime after the implant is removed.  If you get the implant during the first five days of your period, you are protected against pregnancy immediately. Otherwise, you need to use some form of backup birth control -- like a condom, female condom, diaphragm, sponge, or emergency contraception (morning after pill) -- for the first week after getting the implant.  Information gathered from: [facility]        Birth control: IUD  What Is an IUD?  The letters IUD stand for \"intrauterine device.\" IUDs are small, \"T-shaped\" devices made of flexible plastic. A health care provider inserts an IUD into a woman's uterus to prevent pregnancy.  There are three types of IUD available in the [facility]" [place]:  - copper (ParaGard): birth control ParaGard IUD contains copper. It is effective for 12 years.  - two hormonal options (Mirena or Samira): hormonal IUDs releases a small amount of progestin. There are two brands. Mirena is effective for five years. Samira is slightly smaller and effective for three years; it is also used in women who have not had children before because it is smaller and requires less dilation of the cervix.  How Does an IUD Work?  Both the copper and hormonal IUDs work mainly by affecting the way sperm move so they can't join with an egg. If sperm cannot join with an egg, pregnancy cannot happen.  For some women, hormonal IUDs may prevent the egg from leaving the ovary. Pregnancy cannot happen if there is no egg to join with sperm. Progestin also prevents pregnancy by thickening a woman's cervical mucus. The mucus blocks sperm and keeps it from joining with an egg.      How Effective Is the IUD?  Effectiveness is an important and common concern when choosing a birth control method. IUDs are one of the most effective forms of birth control available. Less than 1 out of 100 women will get pregnant each year if they use an IUD.  Keep in mind that the IUD doesn't protect against sexually transmitted infections. Use a condom along with the IUD to reduce the risk of infection.  The ParaGard IUD can be used as emergency birth control, to prevent pregnancy after unprotected intercourse. It can reduce the risk of pregnancy by 99.9 percent if inserted within 120 hours (five days) after unprotected intercourse.      How Safe Is the IUD?  Most women can use an IUD safely. But all medications have some risks, so safety is a common concern when choosing a birth control method. Certain conditions increase the risk of side effects. Talk with your health care provider about your health and whether an IUD is likely to be safe for you. There are many other methods of birth control that may be safe for you if  you cannot use an IUD.  You should not use an IUD if you:  have had a pelvic infection following either childbirth or an  in the past three months  have or may have a sexually transmitted infection or other pelvic infection  think you might be pregnant  have cervical cancer that hasn't been treated  have cancer of the uterus  have unexplained bleeding in your vagina  have pelvic tuberculosis  have a uterine perforation during IUD insertion  A health care provider may find that the unique size, shape, or condition of a woman s uterus does not allow correct placement of an IUD. This is not common.  You should not use the ParaGard IUD if you:  have, or may have, an allergy to copper or have Mk's Disease -- an inherited disease that blocks the body s ability to get rid of copper  You should not use a hormonal IUD if you:  have severe liver disease  have, or may have, breast cancer  If you have a condition that makes it unsafe to use an IUD, don t worry. There are many other methods of birth control that may be safe for you. Read about other methods to find one that is right for you.      What Are the Benefits of an IUD?  IUDs are some of the least expensive, longest lasting forms of birth control available to women today. There are many other benefits.  IUDs may improve your sex life. There is nothing to put in place before intercourse to prevent pregnancy. Some women say that they feel free to be more spontaneous because they do not have to worry about becoming pregnant.   The ParaGard IUD does not change a woman's hormone levels.   Hormonal IUDs may reduce period cramps and make your period lighter. On average, menstrual flow is reduced by 90 percent. For some women, periods stop altogether.   IUDs can be used during breastfeeding.  The ability to become pregnant returns quickly once the IUD is removed.  Some women may worry that they are pregnant if they do not have a regular period. But the IUD is very  effective. If you are concerned about a possible pregnancy, you can always take a pregnancy test.  Overall, most women who get an IUD are very satisfied with their choice.      What Are the Disadvantages of an IUD?  It's important to think about the possible side effects of using an IUD.  You may have  mild to moderate pain when the IUD is put in  cramping or backache for a few days  spotting between periods in the first 3-6 months   irregular periods in the first 3-6 months -- with Mirena or Samira  heavier periods and worse menstrual cramps -- with ParaGard  Pain relievers can usually reduce bleeding, cramping, and other discomforts. If they are severe and do not seem to lessen, tell your health care provider.  Serious problems with the IUD are rare. There are three main things to watch out for when using an IUD:  The IUD can sometimes slip out of the uterus. Sometimes it comes all the way out. Sometimes it only comes out a little. This is more likely to happen to women who are younger and who have never had a baby. If the IUD slips out of place, pregnancy can happen. If it comes out only part of the way, it has to be removed.  In rare situations, a woman could develop an infection when using the IUD. This happens if bacteria get into the uterus when the IUD is inserted. Most infections develop within three weeks of having the IUD inserted. If the infection is not treated, it can affect a woman's ability to become pregnant in the future.  In very rare situations, when the IUD is inserted, it can push through the wall of the uterus. This might sound painful, but it usually isn't. Usually, when this happens, the health care provider will notice it and it can be fixed right away. But if not, the IUD can move around and harm other parts of the body. When this happens, surgery may be needed to remove the IUD.  Most often, if complications happen, they are easy to treat with medicine or other treatments.  It's important  "to pay attention to any symptoms you might have after starting the IUD. Tell your health care provider immediately if you:  find the length of the string ends to be shorter or longer than they were at first, when you feel for them with your fingers  are not able to feel the string ends when you check  feel the hard plastic bottom of the \"T\" part of the IUD against the cervix, when you check  think you might be pregnant  have periods that are much heavier than normal or last much longer than normal  have severe abdominal cramping, pain, or tenderness in the abdomen  have pain or bleeding during sex  have unexplained fever and/or chills  have flu-like symptoms, such as muscle aches or tiredness  have unusual vaginal discharge  have a missed, late, or unusually light period  have unexplained vaginal bleeding  IUDs and Pregnancy  The risk of pregnancy while using an IUD is very low. But if the IUD slips out of place, pregnancy can happen. If you become pregnant, have the IUD removed as soon as you find out that you are pregnant. If you are pregnant with an IUD in place, there is an increased risk of  ectopic pregnancy (pregnancy not in the uterus where it should be)  dangerous pelvic infection  miscarriage  early labor and delivery  Even with the risks, some women may choose not to have the IUD removed during pregnancy. If you don't want to have the IUD removed while you are pregnant, you need to work with your health care provider through your pregnancy.  Ectopic pregnancies are a serious concern. They can be life-threatening. Women who use IUDs are much less likely to have an ectopic pregnancy than women who are not using birth control. But if a woman does become pregnant while using an IUD, it is more likely to be ectopic than if she was not using the IUD.  Symptoms of an ectopic pregnancy include:  irregular vaginal bleeding  pain in the abdomen or tip of the shoulder  sudden weakness or fainting  If you have any of " these symptoms while using an IUD, get medical care right away.      How Soon After Getting an IUD Can I Have Sex?  You can have sex as soon as you like after the IUD is inserted. Hormonal IUDs are effective immediately if inserted within seven days after the start of your period. If you have Mirena or Samira inserted at any other time during your menstrual cycle, use another method of birth control like a condom, female condom, or spermicide if you have vaginal intercourse during the first week after insertion. Protection will begin after seven days.  The ParaGard IUD is effective immediately.  What Happens When I Have an IUD Inserted?  To get an IUD, you need to visit a health care provider. Your health care provider will ask you some questions about your medical history and the way you live your life. It is important to be open about your sex life because the IUD may not be right for you. But don t worry. There are many other birth control methods to choose from if you cannot use the IUD.  If an IUD seems to be a good choice for you, your health care provider will check your vagina and internal organs. You may be tested for sexually transmitted infections or for other infections to make sure it s safe for you to get an IUD. If you have any kind of pelvic infection, you may need treatment before getting an IUD.  An IUD can be inserted at any time of the month. But it is usually more comfortable if you have it inserted in the middle of your menstrual cycle. That is when the cervix -- the opening to the uterus -- is the most open.  An IUD can be inserted after a pregnancy or .   You can have an IUD inserted:  up to 48 hours after giving birth OR after waiting at least four weeks after giving birth. Women who are breastfeeding should wait four weeks before having a hormonal IUD inserted.  immediately after an aspiration   four weeks after a D&E   when the  is complete after taking the   pill. Your health care provider can help you decide when to get the IUD inserted.  A health care provider will insert the IUD. The IUD is inserted into the uterus through the cervix using special instruments.  It is common for women to feel some cramping when the IUD is inserted. Many women only feel mild discomfort. The cramps go away after you rest, or if you take some pain medication. Some health care providers suggest that women take pain medication before the IUD is inserted to lessen the cramps. Some health care providers inject a local anesthetic around the cervix to reduce discomfort.  When the IUD is inserted, some women may feel dizzy. Rarely, a woman might faint. You can ask someone to come with you when you get the IUD inserted so you don t have to drive or go home alone. You should plan to rest at home until any discomfort goes away.  When the IUD is in place, a string will hang down into the vagina. It will be about one to two inches long.      What Should I Do After Getting an IUD?  You should have a checkup after your first period. Don't wait longer than three months after you get your IUD to make sure it is still in place. Women using an IUD should have regular checkups to make sure everything is all right. This can be done at the same time as your periodic GYN exam. Remember when you have your IUD inserted. That way, if you see other health care providers, you can tell them when it needs to be replaced. The ParaGard IUD should be replaced after 12 years. Mirena should be replaced after five years. Samira should be replaced after three years.  If an IUD is going to slip out of place, it will most likely happen in the first few months of use. But it may occur later. The IUD is most likely to slip out of place during your period. Check your pads, tampons, or cups to see if the IUD has fallen out. If it has, you must check with your health care provider. Until then, use another form of birth  control such as condoms.  Between your periods, you can check for the string ends if you want to, but do not pull on them. Pulling the strings might make the IUD move out of place or even come out.      How Is an IUD Removed?  Having an IUD removed or replaced is usually simple. Your health care provider will do it for you. Women should never try to remove IUDs themselves or ask nonprofessionals to do it for them. Serious damage could result.  In rare cases, IUDs cannot be easily pulled free. In these cases, the cervix may have to be dilated and a surgical tool may be used to free the IUD. In very rare cases, surgery becomes necessary.  Information gleaned from [employer]
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
## 2021-06-10 NOTE — PROGRESS NOTES
How have you been doing since we last saw you? any concerns?( new pt. Ask how concussion happen?) pt says headaches are between a two and three.  She says that she still feels fatigued and trouble concentrating. Pt would like to discuss work restrictions with .       Current Symptoms : Yes

## 2021-06-10 NOTE — PROGRESS NOTES
How have you been doing since we last saw you? any concerns?( new pt. Ask how concussion happen?) f/u appointment, wants to talk about changing hours at work, headache 3 on a pain scale      Current Symptoms : Yes, hard to concentrate, light/sound sensitive, headaches.
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
## 2021-06-11 NOTE — PROGRESS NOTES
How have you been doing since we last saw you? any concerns?( new pt. Ask how concussion happen?) f/u apt wants to talk about work schedule, still ongoing headaches       Current Symptoms : Yes
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
## 2021-06-11 NOTE — PROGRESS NOTES
How have you been doing since we last saw you? any concerns?( new pt. Ask how concussion happen?) Pt. Wants to talk with provider about increasing hours at work.      Current Symptoms : Yes Pt. Is having headaches and having troubles concentrating and having fogginess.

## 2021-06-11 NOTE — PROGRESS NOTES
Psychology Discharge Note    Patient has made no follow-up with this writer since most recent psychotherapy appointment in March 2017.  She is considered discharged from this writer's psychological care at this time.      Provider Name: Savanna Franks PsyD, RICHAR  Date: 7/10/2017
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
## 2021-06-12 NOTE — PROGRESS NOTES
"Physical Therapy  Physical Therapy Daily Outpatient Documentation        Rx Units: 3TE, 1MT    Total OP Minutes: 55    Treatment #: 5/8    Pain: 2/10, 3/10  Location: Cervical, HA  Intervention: See below    Subjective: Pt had a massage on Friday and she felt that it was very helpful \"loosening\" things up.    Therapeutic Exercise  Total Minutes: 45    -NuStep x 8 min, level #4, avg METS:  2.2, SPM:  63    -Sidelying reach and roll x 10 reps each side; pt able to get arm to mat ROM.  -Supine cervical chin tuck with head lift 3 x 5 reps with 5 sec hold.  Cues to avoid raising head too high.  -Bridging with ex ball x 20 reps without UE assist.  Mildly unsteady but overall good form.  -Supine serratus punch with 3# x 10 reps ea UE.  Good form.    -Sidelying for R shoulder ER with towel placed under humerus 1 x 10 reps with 3# wt.  Good form this date.  -Prone scap retraction x 15 reps with UE lift with 2#; with verbal and tactile cues to pull scapula down and in.  Good form  -Quadruped alt UE/LE x 10 reps with cues to maintain chin tuck.  Slightly unsteady due to weakness.  Cues to engage glutes with leg raise.  -**Wall Push ups with ex ball x 10 reps with cues to maintain chin tuck.  Good form.  -**Standing scap retraction with orange thera band x 15 reps.  Good form.  -Seated cervical kinesthetic training with pt approximately 90 cm from target with head lamp:   -1.0, 2.0, 3.0, 4.5, 6.0 degree circles CW x 2 reps, CCW x 2 reps. Cues to maintain chin tuck and go slow for accuracy    Manual Therapy  Total Minutes: 10    -Supine cervical distraction 2 x 1 min  -Suboccipital release x 3 min  -Manual stretches to lety levator scap, lety upper traps, and lety SCM; greater tightness noted on the R side  -Midcervical PIVMS gr IV bilaterally  -Sidelying for R shoulder post capsule mob stretch; initially limited but able to achieve WNL ROM    HEP:  -Supine chin tucks  -Supine chin tucks with push down  -Seated scap retraction with " W technique  -Bridging  -Supine serratus punch with 3#  -Corner Pec stretch  -S/L shoulder ER  -Seated cervical isometrics (flex, ext, lety rot, lety SB)  -Quadruped alt UE/LE  -Wall push ups with ex ball  -Stand scap retraction with orange theraband    Assessment/Plan for week of 9/11/2017-9/15/2017:  Pt tolerated the progression of the exercises well with reduced manual therapy techniques.  Continue to increase and progress exercises and reduce manual therapy in order to encourage independence with symptom management.  Pt has one more scheduled appointment remaining.  Plan to assess and schedule 1 or 2 remaining appointments if pt continues to be symptomatic.  If pt's symptoms appear well managed over the next 2 weeks (next appt is 9/25), then likely discharge at that time.    Additional Notes:
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
## 2021-06-12 NOTE — PROGRESS NOTES
"Physical Therapy  Physical Therapy Daily Outpatient Documentation        Rx Units: 3TE, 1MT    Total OP Minutes: 55    Treatment #: 4/8    Pain: 2/10, 5-6/10  Location: Cervical, HA  Intervention: See below    Subjective: \"My neck is doing well.\"  Pt reports elevated HA symptoms this AM due to increased traffic.    Therapeutic Exercise  Total Minutes: 40    -NuStep x 8 min, level #4, avg METS:  1.8, SPM:  65    -Sidelying reach and roll x 10 reps each side; pt able to get arm to mat ROM.  -Supine cervical chin tuck with isometric push down x 3 reps with 15 sec hold.  Cues to isolate DNF first prior to push down.  Good form after single cue.  -Supine cervical chin tuck with head lift x 5 reps with 5 sec hold.    -Bridging with ex ball x 20 reps without UE assist.  Cues to engage glutes and TA for stability. Improved control and stability this date.  -Supine serratus punch with 2# x 15 reps ea UE.  Good form.  Pt felt it was easier to perform on her L side vs R side.  -Sidelying for R shoulder ER with towel placed under humerus 2 x 10 reps with 2# wt.  Cues to set scapula before ER. Occasional overuse of R upper trap.  -Prone scap retraction x 10 reps with UE lift with 2#; with verbal and tactile cues to pull scapula down and in.  Noted improved mobility and engagement of R scap mm after manual therapy and above exercises.  -**Quadruped alt UE/LE x 10 reps with cues to maintain chin tuck.  Slightly unsteady due to weakness.  -Reviewed Seated cervical isometrics for flexion, extension, lety rotation, lety sidebending with tactile cues to demo appropriate force.      Manual Therapy  Total Minutes: 15    -Supine cervical distraction 3 x 1 min  -Suboccipital release x 3 min  -Manual stretches to lety levator scap, lety upper traps, and lety SCM; greater tightness noted on the R side  -Midcervical PIVMS gr IV bilaterally  -Hooklying mid thoracic gr V with (+) cavitation  -Sidelying R scap medial border scouring with retraction " and depression  -Sidelying for R shoulder post capsule mob stretch; initially limited but able to achieve WNL ROM    HEP:  -Supine chin tucks  -Supine chin tucks with push down  -Seated scap retraction with W technique  -Bridging  -Supine serratus punch with 3#  -Corner Pec stretch  -S/L shoulder ER  -Seated cervical isometrics (flex, ext, lety rot, lety SB)  -Quadruped alt UE/LE    Assessment/Plan for week of 9/4/2017-9/8/2017:  Pt continues to demo improvement of strengthening and engagement of R scapula stabilizing muscles with less pain.  The **'ed exercises above were issued to her HEP.  Plan to progress quadruped exercises and add wall push ups, and cervical kinesthetic training as able.    Additional Notes:
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
## 2021-06-12 NOTE — PROGRESS NOTES
"Physical Therapy  Physical Therapy Daily Outpatient Documentation        Rx Units: 3TE, 1MT    Total OP Minutes: 55    Treatment #: 3/8    Pain: 5/10, 4/10  Location: Cervical, HA  Intervention: See below    Subjective: Pt notes her R shoulder area feels like \"mush\" and weak.  Pt remains compliant with her HEP.    Therapeutic Exercise  Total Minutes: 40    -NuStep x 7 min, level #4, avg METS:  1.8, SPM:  58    -Sidelying reach and roll x 10 reps each side; pt able to get arm to mat ROM.  -Supine cervical chin tuck with isometric push down x 10 reps with 8 sec hold.  Cues to isolate DNF first prior to push down.  Good form after single cue.  -Bridging with ex ball x 15 reps with UE assist due to increased imbalance and weakness.  Cues to engage glutes and TA for stability.   -Supine serratus punch with 1.1# x 15 reps ea UE.  Good form.  Pt felt it was easier to perform on her L side vs R side.  -**Sidelying for R shoulder ER with towel placed under humerus 2 x 10 reps with 1.1# wt.  Cues to set scapula before ER. Pt had difficulty engaging lower trap during 1st set; different verbal cueing and ongoing tactile cues during 2nd set improved form.  -Prone scap retraction 2 x 10 reps with verbal and tactile cues to pull scapula down and in.  Noted improved mobility and engagement of R scap mm after manual therapy and above exercises.  -**Seated cervical isometrics for flexion, extension, lety rotation, lety sidebending x 3 reps with 5 sec iso holds in each direction.  Cues for tall posture, maintain neutral cervical spine, and submax contraction.        Manual Therapy  Total Minutes: 15    -Supine cervical distraction 3 x 1 min  -Suboccipital release x 3 min  -Manual stretches to lety levator scap, lety upper traps, and lety SCM; greater tightness noted on the R side  -Midcervical PIVMS gr IV bilaterally  -Hooklying mid thoracic gr V with (+) cavitation  -Sidelying R scap medial border scouring with retraction and " depression  -Sidelying for R shoulder post capsule mob stretch; initially limited but able to achieve WNL ROM    HEP:  -Supine chin tucks  -Supine chin tucks with push down  -Seated scap retraction with W technique  -Bridging  -Supine serratus punch with 3#  -Corner Pec stretch  -S/L shoulder ER  -Seated cervical isometrics (flex, ext, lety rot, lety SB)    Assessment/Plan for week of 8/28/2017-9/1/2017:  Pt demos improved engagement of R scapula stabilizing muscles after manual therapy and increased tactile and verbal cues with exercises aimed to engage the lower/mid trapezius muscles.  Continue to focus on the mobility of the R scapula to assist with muscular balance and reduced abnormal stresses through the neck contributing to the pt's symptoms.  The **'ed exercises above were issued to her HEP.  Plan to add quadruped exercises, cervical isometrics, wall push ups, and cervical kinesthetic training as able.    Additional Notes:

## 2021-06-12 NOTE — PROGRESS NOTES
Physical Therapy  Physical Therapy Daily Outpatient Documentation        Rx Units: 2TE, 1MT    Total OP Minutes: 45    Treatment #: 2/8    Pain: 4/10, 5/10  Location: Cervical, HA  Intervention: See below    Subjective: Pt reports that she's feeling a little better since the initial evaluation.    Therapeutic Exercise  Total Minutes: 30    -NuStep x 7 min, level #3, avg METS:  1.6, SPM:  48    -Supine cervical chin tucks x 10 reps with 10 sec hold.  Good form without additional cueing from PT.  -**Supine cervical chin tuck with isometric push down x 10 reps with 5 sec hold.  Cues to isolate DNF first prior to push down.  Pt noted increased tightness on her R side.  -**Bridging x 15 reps without UE assist.  Cues to engage glutes and TA for stability.  Good form.  -**Supine serratus punch with 3# x 15 reps ea UE.  Cues to relax upper traps with exercise.  Pt felt it was easier to perform on her L side vs R side.  -Prone scap retraction x 10 reps with verbal and tactile cues to pull scapula down and in.  Pt is able to engage L side with greater ease and demos better strength vs R.  -Seated W scap retraction x 10 reps.  Cues to pull scapula down and in.  -**Standing corner pec stretch 2 x 30 sec      Manual Therapy  Total Minutes: 10    -Supine cervical distraction 3 x 1 min  -Suboccipital release x 3 min  -Manual stretches to lety levator scap, lety upper traps, and lety SCM; greater tightness noted on the R side  -Midcervical PIVMS gr IV bilaterally  -Hooklying mid thoracic gr V with (+) cavitation    HEP:  -Supine chin tucks  -Supine chin tucks with push down  -Seated scap retraction with W technique  -Bridging  -Supine serratus punch with 3#  -Corner Pec stretch    Assessment/Plan for week of 8/21/2017-8/25/2017:  Pt returns from her initial eval for her subsequent treatment session this date.  She reports compliance of her HEP initiated at the eval and completed today's exercises with overall ease but demos weakness  and requirement of cueing for correct form.  Plan to assess pt's progression of exercises this date next Rx session.  The **'ed exercises above were added to the pt's written HEP.  Plan to add quadruped exercises, cervical isometrics, wall push ups, and cervical kinesthetic training as able.    Additional Notes:
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
## 2021-06-15 NOTE — PROGRESS NOTES
"  Assessment & Plan     Erythematous condition  Patient originally presented for the below complaint, I am more intrigued by this fluctuating, waxing and waning appearance of discrete erythematous lesions on exposed skin.  Considered photodermatitis as well as erythematous protoporphyria, but chose to refer patient to dermatology for work-up.  - Ambulatory referral to Dermatology - [facility] [place]    Eczema, unspecified type  Although patient presented to clinic for the patch on her back, this appears to be just dry skin at this point.  Would recommend patient continue to monitor it, she can apply hydrocortisone and lotion if the pruritus at that location becomes uncomfortable.  I suspect this is eczema or some other dry skin related condition.             BMI:   Estimated body mass index is 33.81 kg/m  as calculated from the following:    Height as of 8/21/20: 5' 4\" (1.626 m).    Weight as of this encounter: 197 lb (89.4 kg).     No follow-ups on file.    Elizabeth Barba MD  [facility]     Renetta COYNE Krzysztof is 35 y.o. and presents to clinic today for the following health issues   HPI   Patient presented because on Friday her  noticed a patch of discoloration on her back.  It originally looked like bruising to him but then faded to dry skin.  Originally she stated it was brown with surrounding redness, but is now transitioning to just being flesh-colored.  It does not bother her just feels like dry skin to her.    However, patient also came in because she has patches of defined erythema that come and go.  They are predominantly located on areas exposed to sunlight, but include the palms of the hands.  The lesions range in size from several millimeters to larger, slightly less discrete areas across patient's knuckles that could be as large as a quarter.  They do not itch.  She has noticed them after biking in the summertime or after being exposed to the " cold in the wintertime.      Review of Systems  Hemorrhoids, back pain, rashes      Objective    /70   Pulse (!) 109   Wt 197 lb (89.4 kg)   SpO2 98%   BMI 33.81 kg/m    Body mass index is 33.81 kg/m .  Physical Exam  Gen: NAD  Skin:   -Currently, patch of dry, no nondiscolored/appreciated just to the right of midline in patient's low back extending approximately 7 cm in diameter  -Past images of skin per patient's phone, several millimeter in diameter erythematous lesions appreciated on the palms of patient's hands, larger lesions appreciated on the dorsum and extending up patient's arm

## 2021-06-16 NOTE — PROGRESS NOTES
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 today. She is alert and fully oriented. Speech is normal. Thought processes normal with normal prehension and expression. Thoughts are organized and linear. Content is pertinent to the conversation and without evidence of auditory or visual hallucinations. No delusional ideation. Affect/mood is euthymic-bright, even. Gen. fund of knowledge, insight and memory are normal.
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
## 2021-06-18 NOTE — PROGRESS NOTES
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 reassured the patient her symptoms are very common when a concussion is present and will improve with time. I asked her to call with any questions or concerns and will see her again in clinic in about 8 weeks.      Total time spent with the patient today was 25 minutes with greater than 50% of the time spent in counseling and care coordination.       Mental Status Examination  Patient is casually dressed and seated for evaluation. She is cooperative with questioning and eye contact is good. She is fully engaged in conversation today. She is alert and fully oriented. Speech is normal. Thought processes normal with normal prehension and expression. Thoughts are organized and linear. Content is pertinent to the conversation and without evidence of auditory or visual hallucinations. No delusional ideation. Affect/mood is euthymic-bright, even. Gen. fund of knowledge, insight and memory are normal.
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
## 2021-06-19 NOTE — PROGRESS NOTES
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 mouth every 6 (six) hours as needed. 30 tablet 0     VENTOLIN HFA 90 mcg/actuation inhaler INHALE 2 PUFFS EVERY 6 (SIX) HOURS AS NEEDED FOR WHEEZING OR SHORTNESS OF BREATH. 18 Inhaler 3     No current facility-administered medications for this encounter.        Allergies   Allergen Reactions     Bacitracin Rash     Social History     Social History     Marital status:      Spouse name: N/A     Number of children: N/A     Years of education: N/A     Occupational History     Not on file.     Social History Main Topics     Smoking status: Former Smoker     Years: 2.00     Smokeless tobacco: Never Used     Alcohol use No     Drug use: No     Sexual activity: Yes     Partners: Male     Other Topics Concern     Not on file     Social History Narrative       The following portions of the patient's history were reviewed and updated as appropriate: allergies, current medications, past family history, past medical history, past social history, past surgical history and problem list.    Review of Systems  A comprehensive review of systems was negative       Objective:     Vitals:    07/19/18 0900   BP: 105/66   Pulse: 66   Weight: 182 lb 9.6 oz (82.8 kg)       Discussion was held with the patient today regarding concussion in general including types of injury, symptoms that are common, treatment and variability in time to recover  I have reassured the patient her symptoms are very common when a  concussion is present and will improve with time. I asked her to call with any questions or concerns and will see her again in clinic if she has any problems concerns another concussion or any return of symptoms..      Total time spent with the patient today was 15 minutes with greater than 50% of the time spent in counseling and care coordination.       Mental Status Examination  Patient is casually dressed and seated for evaluation. She is cooperative with questioning and eye contact is good. She is fully engaged in conversation today. She is alert and fully oriented. Speech is normal. Thought processes normal with normal prehension and expression. Thoughts are organized and linear. Content is pertinent to the conversation and without evidence of auditory or visual hallucinations. No delusional ideation. Affect/mood is euthymic-bright, even. Gen. fund of knowledge, insight and memory are normal.
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
## 2021-06-20 NOTE — LETTER
Letter by Elizabeth Barba MD at      Author: Elizabeth Barba MD Service: -- Author Type: --    Filed:  Encounter Date: 5/20/2020 Status: (Other)         May 21, 2020     Patient: Renetta Blankenship   YOB: 1985   Date of Visit: 5/19/2020     To whom it may concern,    Due to underlying health concerns, Renetta Blankenship is at increased risk of severe disease if exposed to COVID-19. Renetta is required to isolate at home at this time. She is able to work virtually if available. This restriction is in place until Wed 5/27/2020. She has an appointment to discuss plans for potential ongoing restrictions during the pandemic with her primary care provider on 5/26/2020.    Please contact me with further questions or concerns,    Electronically signed: Angelita Justice MD

## 2021-06-20 NOTE — LETTER
Letter by Angelita Justice MD at      Author: Angelita Justice MD Service: -- Author Type: --    Filed:  Encounter Date: 5/20/2020 Status: (Other)         Renetta Blankenship  [address] [facility] [address]        To whom it may concern,    Due to underlying health concerns, Renetta Blankenship is at increased risk of severe disease if exposed to COVID-19. I recommend that Renetta  isolate at home at this time. She is able to work virtually if available. This restriction is in place until Wed 5/27/2020. She has an appointment to discuss plans for potential ongoing restrictions during the pandemic with her primary care provider on 5/26/2020.    Please contact me with further questions or concerns,    Electronically signed: Angelita Justice MD
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
## 2021-06-20 NOTE — LETTER
Letter by Elizabeth Barba MD at      Author: Elizabeth Barba MD Service: -- Author Type: --    Filed:  Encounter Date: 5/11/2020 Status: (Other)         May 11, 2020     Patient: Renetta Blankenship   YOB: 1985   Date of Visit: 5/11/2020       To Whom It May Concern:    It is my medical opinion that Renetta Blankenship is safe to return to work. However, given her medical history it would be safer for this patient to work from home whenever possible, limit exposures at work, maintain 6 feet of distance between herself and other employees or patients as much as possible and limit contact to less than 10 minutes if possible. It is also strongly recommended that Ms Blankenship not have contact with staff or clients who are unmasked at this time. It is essential and medically necessary for her to have access to the appropriate PPE.     If you have any questions or concerns, please don't hesitate to call.    Sincerely,        Electronically signed by Elizabeth Barba MD

## 2021-06-20 NOTE — LETTER
Letter by Elizabeth Frey MD at      Author: Elizabeth Frey MD Service: -- Author Type: --    Filed:  Encounter Date: 7/15/2020 Status: (Other)         Renetta Blankenship  [address] [facility] [address]      July 15, 2020      Dear Renetta Blankenship,    Per our refill protocol, you are due for a medication check office visit. Your prescription for PROZAC was sent to your pharmacy (07.6.2020). Please call [phone] to schedule an PHYSICAl visit with dr. frey before your next refill is needed to avoid delays.    Thank you,  [facility]

## 2021-06-20 NOTE — LETTER
Letter by Felisha Sequeira LPN at      Author: Felisha Sequeira LPN Service: -- Author Type: --    Filed:  Encounter Date: 5/1/2020 Status: (Other)       5/1/2020        Renetta Blankenship  [address] [facility] [address]    This letter provides a written record that you were tested for COVID-19 on 4/30/20.     Your result was negative.    This means that we didnt find the virus that causes COVID-19 in your sample. A test may show negative when you do actually have the virus. This can happen when the virus is in the early stages of infection, before you feel illness symptoms.    Even if you dont have symptoms, they may still appear. For safety, its very important to follow these rules.    Keep yourself away from others (self-isolation):      Stay home. Dont go to work, school or anywhere else.     Stay away from others in your home. No hugging, kissing or shaking hands.    Dont let anyone visit.    Cover your mouth and nose with a mask, tissue or wash cloth to avoid spreading germs.    Stay in self-isolation until you meet ALL of the guidelines below:    1. You have had no fever for at least 72 hours (that is 3 full days of no fever without the use of medicine that reduces fevers), AND  2. other symptoms (such as cough, shortness of breath) have gotten /better, AND  3. at least 7 days have passed since your symptoms first appeared.    Going back to work  Check with your employer for any guidelines to follow for going back to work.    Employers: This document serves as formal notice that your employee tested negative for COVID-19, as of the testing date shown above.    For questions regarding this letter or your Negative COVID-19 result, call [phone] between 8A to 6:30P (M-F) and 10A to 6:30P (weekends).

## 2021-06-20 NOTE — LETTER
Letter by Elizabeth Barba MD at      Author: Elizabeth Barba MD Service: -- Author Type: --    Filed:  Encounter Date: 5/26/2020 Status: (Other)         May 26, 2020     Patient: Renetta Blankenship   YOB: 1985   Date of Visit: 5/26/2020       To Whom It May Concern:    It is my medical opinion that Renetta Blankenship are at greater risk than the average person to a COVID-19 exposure given underlying medical history.     Given the risks inherent in her position, if she is going to be seeing patients closer than 6 feet, she will need to be outfitted with an N-95 mask that has been properly fit-tested and a full face shield for her own protection. It is intolerable for her to be expected to see patients for any amount of time without proper PPE. Standard issue surgical masks are to protect the patient from Ms. Blankenship and cannot be expected to protect her from a possibly contagious patient.     If these guidelines cannot be met, please consider alternative locations or tasks for Ms Blankenship's safety. This could include [employer] work or other creative solutions.     If you have any questions or concerns, please don't hesitate to call.    Sincerely,        Electronically signed by Elizabeth Barba MD

## 2021-06-23 NOTE — TELEPHONE ENCOUNTER
Refill Approved    Rx renewed per Medication Renewal Policy. Medication was last renewed on 1/11/18.    Nishi Bains, [facility] Triage/Med Refill 2/11/2019     Requested Prescriptions   Pending Prescriptions Disp Refills     VENTOLIN HFA 90 mcg/actuation inhaler [Pharmacy Med Name: VENTOLIN HFA 90 MCG INHALER] 18 Inhaler 2     Sig: INHALE 2 PUFFS EVERY 6 (SIX) HOURS AS NEEDED FOR WHEEZING OR SHORTNESS OF BREATH.    Albuterol/Levalbuterol Refill Protocol Passed - 2/8/2019  8:19 PM       Passed - PCP or prescribing provider visit in last year    Last office visit with prescriber/PCP: Visit date not found OR same dept: 2/9/2018 Mary Anne Dowling DO OR same specialty: 6/14/2018 Rubina Alexander NP Last physical: 9/28/2018       Next appt within 3 mo: Visit date not found  Next physical within 3 mo: Visit date not found  Prescriber OR PCP: Elizabeth Barba MD  Last diagnosis associated with med order: 1. Mild intermittent asthma without complication  - VENTOLIN HFA 90 mcg/actuation inhaler [Pharmacy Med Name: VENTOLIN HFA 90 MCG INHALER]; INHALE 2 PUFFS EVERY 6 (SIX) HOURS AS NEEDED FOR WHEEZING OR SHORTNESS OF BREATH.  Dispense: 18 Inhaler; Refill: 2    If protocol passes may refill for 6 months if within 3 months of last provider visit (or a total of 9 months). If patient requesting >1 inhaler per month refill x 6 months and have patient make appointment with provider.

## 2021-06-24 ENCOUNTER — RECORDS - HEALTHEAST (OUTPATIENT)
Dept: ADMINISTRATIVE | Facility: OTHER | Age: 36
End: 2021-06-24

## 2021-06-24 NOTE — TELEPHONE ENCOUNTER
Recommend continuing plan as discussed at office visit for another 2 weeks and if still having issues then could come back to clinic to re-evaluate and discuss next steps.    Dr Alegria

## 2021-06-24 NOTE — TELEPHONE ENCOUNTER
Ok to do mammogram now vs later though I as we discussed in clinic there is low likelihood for cancer given presence of pain and no appreciable masses. Order placed.    Maria Alejandra Alegria MD  [facility]

## 2021-06-24 NOTE — TELEPHONE ENCOUNTER
Orders being requested: Mammogram  Reason service is needed/diagnosis: Continued breast pain as noted at OV 2/22/2019  When are orders needed by: ASAP  Where to send Orders: Enter into Epic  Okay to leave detailed message?  Yes    Saw Dr. Alegria regarding this.

## 2021-06-24 NOTE — TELEPHONE ENCOUNTER
Please place order if appropriate     Per 2/22/2019 OV note:  1. Breast pain  Suspect right breast pain secondary to stretching of Yovany's ligaments but hold the breast or underlying muscular pain.  No masses or skin changes concerning for malignancy.  Discussed trial of the following: Wearing supportive sports bra at all times, start scheduled naproxen, ice/heat to area.  If patient continues to have issues could discuss further workup.

## 2021-06-24 NOTE — TELEPHONE ENCOUNTER
Patient Returning Call  Reason for call:  Patient   Information relayed to patient:  Below messages relayed.   Patient has additional questions:  Yes  If YES, what are your questions/concerns:  Patient states that since her OV there has been no improvement in pain and she has been doing the recommendations below stating the sports bra is very painful to wear 24/7. Requesting if she can have mammogram now instead of waiting longer?  Okay to leave a detailed message?: Yes
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
## 2021-06-29 NOTE — TELEPHONE ENCOUNTER
Contacted patient and asked if she had called [facility] for her shortness of breath.  Patient stated that she has been working with Dr. Barba advised her to be seen.  Patient tested negative for covid 19 one month ago.  Advised patient to contact [facility]. Patient refused and stated she will contact Dr. Barba.   independent

## 2021-07-03 NOTE — ADDENDUM NOTE
Addendum Note by Mag Chowdhury CMA at 7/19/2018  1:16 PM     Author: Mag Chowdhury CMA Service: -- Author Type: Certified Medical Assistant    Filed: 7/19/2018  1:16 PM Date of Service: 7/19/2018  1:16 PM Status: Signed    : Mag Chowdhury CMA (Certified Medical Assistant)    Encounter addended by: Mag Chowdhury CMA on: 7/19/2018  1:16 PM<BR>     Actions taken: Charge Capture section accepted

## 2021-07-03 NOTE — ADDENDUM NOTE
Addendum Note by Mag Chowdhury CMA at 5/17/2018  2:36 PM     Author: Mag Chowdhury CMA Service: -- Author Type: Certified Medical Assistant    Filed: 5/17/2018  2:36 PM Date of Service: 5/17/2018  2:36 PM Status: Signed    : Mag Chowdhury CMA (Certified Medical Assistant)    Encounter addended by: Mag Chowdhury CMA on: 5/17/2018  2:36 PM<BR>     Actions taken: Charge Capture section accepted

## 2021-07-14 PROBLEM — Z34.90 PREGNANT: Status: RESOLVED | Noted: 2017-02-03 | Resolved: 2017-08-31

## 2021-09-26 ENCOUNTER — HEALTH MAINTENANCE LETTER (OUTPATIENT)
Age: 36
End: 2021-09-26

## 2021-10-16 ENCOUNTER — MYC MEDICAL ADVICE (OUTPATIENT)
Dept: FAMILY MEDICINE | Facility: CLINIC | Age: 36
End: 2021-10-16

## 2021-10-16 DIAGNOSIS — F43.22 ADJUSTMENT DISORDER WITH ANXIETY: ICD-10-CM

## 2021-10-16 DIAGNOSIS — J45.30 MILD PERSISTENT ASTHMA WITHOUT COMPLICATION: Primary | ICD-10-CM

## 2021-11-09 ENCOUNTER — OFFICE VISIT (OUTPATIENT)
Dept: FAMILY MEDICINE | Facility: CLINIC | Age: 36
End: 2021-11-09
Payer: COMMERCIAL

## 2021-11-09 VITALS
BODY MASS INDEX: 34.66 KG/M2 | DIASTOLIC BLOOD PRESSURE: 84 MMHG | SYSTOLIC BLOOD PRESSURE: 112 MMHG | HEART RATE: 76 BPM | HEIGHT: 64 IN | WEIGHT: 203 LBS | OXYGEN SATURATION: 99 %

## 2021-11-09 DIAGNOSIS — Z00.00 HEALTH CARE MAINTENANCE: ICD-10-CM

## 2021-11-09 DIAGNOSIS — R63.5 WEIGHT GAIN: ICD-10-CM

## 2021-11-09 DIAGNOSIS — R06.83 SNORING: ICD-10-CM

## 2021-11-09 DIAGNOSIS — F43.22 ADJUSTMENT DISORDER WITH ANXIETY: ICD-10-CM

## 2021-11-09 DIAGNOSIS — J45.20 MILD INTERMITTENT ASTHMA WITHOUT COMPLICATION: ICD-10-CM

## 2021-11-09 DIAGNOSIS — L50.2 URTICARIA DUE TO COLD: ICD-10-CM

## 2021-11-09 DIAGNOSIS — Z11.59 NEED FOR HEPATITIS C SCREENING TEST: Primary | ICD-10-CM

## 2021-11-09 LAB
ALBUMIN SERPL-MCNC: 4 G/DL (ref 3.5–5)
ALP SERPL-CCNC: 86 U/L (ref 45–120)
ALT SERPL W P-5'-P-CCNC: 16 U/L (ref 0–45)
ANION GAP SERPL CALCULATED.3IONS-SCNC: 9 MMOL/L (ref 5–18)
AST SERPL W P-5'-P-CCNC: 14 U/L (ref 0–40)
BILIRUB SERPL-MCNC: 1.1 MG/DL (ref 0–1)
BUN SERPL-MCNC: 11 MG/DL (ref 8–22)
CALCIUM SERPL-MCNC: 9.4 MG/DL (ref 8.5–10.5)
CHLORIDE BLD-SCNC: 105 MMOL/L (ref 98–107)
CHOLEST SERPL-MCNC: 196 MG/DL
CO2 SERPL-SCNC: 26 MMOL/L (ref 22–31)
CREAT SERPL-MCNC: 0.68 MG/DL (ref 0.6–1.1)
FASTING STATUS PATIENT QL REPORTED: YES
GFR SERPL CREATININE-BSD FRML MDRD: >90 ML/MIN/1.73M2
GLUCOSE BLD-MCNC: 89 MG/DL (ref 70–125)
HCV AB SERPL QL IA: NONREACTIVE
HDLC SERPL-MCNC: 68 MG/DL
LDLC SERPL CALC-MCNC: 106 MG/DL
POTASSIUM BLD-SCNC: 4.2 MMOL/L (ref 3.5–5)
PROT SERPL-MCNC: 7.1 G/DL (ref 6–8)
SODIUM SERPL-SCNC: 140 MMOL/L (ref 136–145)
TRIGL SERPL-MCNC: 109 MG/DL
TSH SERPL DL<=0.005 MIU/L-ACNC: 0.98 UIU/ML (ref 0.3–5)

## 2021-11-09 PROCEDURE — 90471 IMMUNIZATION ADMIN: CPT | Performed by: FAMILY MEDICINE

## 2021-11-09 PROCEDURE — 80053 COMPREHEN METABOLIC PANEL: CPT | Performed by: FAMILY MEDICINE

## 2021-11-09 PROCEDURE — 99395 PREV VISIT EST AGE 18-39: CPT | Mod: 25 | Performed by: FAMILY MEDICINE

## 2021-11-09 PROCEDURE — 84443 ASSAY THYROID STIM HORMONE: CPT | Performed by: FAMILY MEDICINE

## 2021-11-09 PROCEDURE — 90732 PPSV23 VACC 2 YRS+ SUBQ/IM: CPT | Performed by: FAMILY MEDICINE

## 2021-11-09 PROCEDURE — 80061 LIPID PANEL: CPT | Performed by: FAMILY MEDICINE

## 2021-11-09 PROCEDURE — 86803 HEPATITIS C AB TEST: CPT | Performed by: FAMILY MEDICINE

## 2021-11-09 PROCEDURE — 36415 COLL VENOUS BLD VENIPUNCTURE: CPT | Performed by: FAMILY MEDICINE

## 2021-11-09 PROCEDURE — 99213 OFFICE O/P EST LOW 20 MIN: CPT | Mod: 25 | Performed by: FAMILY MEDICINE

## 2021-11-09 RX ORDER — ALBUTEROL SULFATE 90 UG/1
2 AEROSOL, METERED RESPIRATORY (INHALATION) EVERY 6 HOURS
Qty: 18 G | Refills: 3 | Status: SHIPPED | OUTPATIENT
Start: 2021-11-09

## 2021-11-09 RX ORDER — FLUOXETINE 40 MG/1
40 CAPSULE ORAL DAILY
Qty: 30 CAPSULE | Refills: 3 | Status: SHIPPED | OUTPATIENT
Start: 2021-11-09 | End: 2022-03-14

## 2021-11-09 ASSESSMENT — PATIENT HEALTH QUESTIONNAIRE - PHQ9
SUM OF ALL RESPONSES TO PHQ QUESTIONS 1-9: 6
10. IF YOU CHECKED OFF ANY PROBLEMS, HOW DIFFICULT HAVE THESE PROBLEMS MADE IT FOR YOU TO DO YOUR WORK, TAKE CARE OF THINGS AT HOME, OR GET ALONG WITH OTHER PEOPLE: NOT DIFFICULT AT ALL
SUM OF ALL RESPONSES TO PHQ QUESTIONS 1-9: 6

## 2021-11-09 ASSESSMENT — MIFFLIN-ST. JEOR: SCORE: 1590.8
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
## 2021-11-09 NOTE — LETTER
My Asthma Action Plan    Name: Renetta Blankenship   YOB: 1985  Date: 11/9/2021   My doctor: Elizabeth Barba MD   My clinic: [facility]        My Rescue Medicine:   Albuterol inhaler (Proair/Ventolin/Proventil HFA)  2-4 puffs EVERY 4 HOURS as needed. Use a spacer if recommended by your provider.   My Asthma Severity:   Intermittent / Exercise Induced  Know your asthma triggers: cold air             GREEN ZONE   Good Control    I feel good    No cough or wheeze    Can work, sleep and play without asthma symptoms       Take your asthma control medicine every day.     1. If exercise triggers your asthma, take your rescue medication    15 minutes before exercise or sports, and    During exercise if you have asthma symptoms  2. Spacer to use with inhaler: If you have a spacer, make sure to use it with your inhaler             YELLOW ZONE Getting Worse  I have ANY of these:    I do not feel good    Cough or wheeze    Chest feels tight    Wake up at night   1. Keep taking your Green Zone medications  2. Start taking your rescue medicine:    every 20 minutes for up to 1 hour. Then every 4 hours for 24-48 hours.  3. If you stay in the Yellow Zone for more than 12-24 hours, contact your doctor.  4. If you do not return to the Green Zone in 12-24 hours or you get worse, start taking your oral steroid medicine if prescribed by your provider.           RED ZONE Medical Alert - Get Help  I have ANY of these:    I feel awful    Medicine is not helping    Breathing getting harder    Trouble walking or talking    Nose opens wide to breathe       1. Take your rescue medicine NOW  2. If your provider has prescribed an oral steroid medicine, start taking it NOW  3. Call your doctor NOW  4. If you are still in the Red Zone after 20 minutes and you have not reached your doctor:    Take your rescue medicine again and    Call 911 or go to the emergency room right away    See your regular doctor within  2 weeks of an Emergency Room or Urgent Care visit for follow-up treatment.          Annual Reminders:  Meet with Asthma Educator,  Flu Shot in the Fall, consider Pneumonia Vaccination for patients with asthma (aged 19 and older).    Pharmacy: [id number] [place] [address]    Electronically signed by Elizabeth Barba MD   Date: 11/09/21                    Asthma Triggers  How To Control Things That Make Your Asthma Worse    Triggers are things that make your asthma worse.  Look at the list below to help you find your triggers and   what you can do about them. You can help prevent asthma flare-ups by staying away from your triggers.      Trigger                                                          What you can do   Cigarette Smoke  Tobacco smoke can make asthma worse. Do not allow smoking in your home, car or around you.  Be sure no one smokes at a child s day care or school.  If you smoke, ask your health care provider for ways to help you quit.  Ask family members to quit too.  Ask your health care provider for a referral to Quit Plan to help you quit smoking, or call 5-660-383-PLAN.     Colds, Flu, Bronchitis  These are common triggers of asthma. Wash your hands often.  Don t touch your eyes, nose or mouth.  Get a flu shot every year.     Dust Mites  These are tiny bugs that live in cloth or carpet. They are too small to see. Wash sheets and blankets in hot water every week.   Encase pillows and mattress in dust mite proof covers.  Avoid having carpet if you can. If you have carpet, vacuum weekly.   Use a dust mask and HEPA vacuum.   Pollen and Outdoor Mold  Some people are allergic to trees, grass, or weed pollen, or molds. Try to keep your windows closed.  Limit time out doors when pollen count is high.   Ask you health care provider about taking medicine during allergy season.     Animal Dander  Some people are allergic to skin flakes, urine or saliva from pets with fur or  feathers. Keep pets with fur or feathers out of your home.    If you can t keep the pet outdoors, then keep the pet out of your bedroom.  Keep the bedroom door closed.  Keep pets off cloth furniture and away from stuffed toys.     Mice, Rats, and Cockroaches  Some people are allergic to the waste from these pests.   Cover food and garbage.  Clean up spills and food crumbs.  Store grease in the refrigerator.   Keep food out of the bedroom.   Indoor Mold  This can be a trigger if your home has high moisture. Fix leaking faucets, pipes, or other sources of water.   Clean moldy surfaces.  Dehumidify basement if it is damp and smelly.   Smoke, Strong Odors, and Sprays  These can reduce air quality. Stay away from strong odors and sprays, such as perfume, powder, hair spray, paints, smoke incense, paint, cleaning products, candles and new carpet.   Exercise or Sports  Some people with asthma have this trigger. Be active!  Ask your doctor about taking medicine before sports or exercise to prevent symptoms.    Warm up for 5-10 minutes before and after sports or exercise.     Other Triggers of Asthma  Cold air:  Cover your nose and mouth with a scarf.  Sometimes laughing or crying can be a trigger.  Some medicines and food can trigger asthma.

## 2021-11-10 ASSESSMENT — PATIENT HEALTH QUESTIONNAIRE - PHQ9: SUM OF ALL RESPONSES TO PHQ QUESTIONS 1-9: 6

## 2021-11-14 ENCOUNTER — TELEPHONE (OUTPATIENT)
Dept: FAMILY MEDICINE | Facility: CLINIC | Age: 36
End: 2021-11-14
Payer: COMMERCIAL

## 2021-11-17 ENCOUNTER — TRANSFERRED RECORDS (OUTPATIENT)
Dept: HEALTH INFORMATION MANAGEMENT | Facility: CLINIC | Age: 36
End: 2021-11-17
Payer: COMMERCIAL

## 2021-12-09 DIAGNOSIS — F43.22 ADJUSTMENT DISORDER WITH ANXIETY: ICD-10-CM

## 2021-12-10 RX ORDER — FLUOXETINE 40 MG/1
CAPSULE ORAL
Qty: 30 CAPSULE | Refills: 3 | OUTPATIENT
Start: 2021-12-10

## 2022-01-31 ENCOUNTER — TRANSFERRED RECORDS (OUTPATIENT)
Dept: HEALTH INFORMATION MANAGEMENT | Facility: CLINIC | Age: 37
End: 2022-01-31
Payer: COMMERCIAL

## 2022-11-11 ENCOUNTER — OFFICE VISIT (OUTPATIENT)
Dept: FAMILY MEDICINE | Facility: CLINIC | Age: 37
End: 2022-11-11
Payer: COMMERCIAL

## 2022-11-11 VITALS
HEART RATE: 87 BPM | RESPIRATION RATE: 16 BRPM | WEIGHT: 207.9 LBS | OXYGEN SATURATION: 98 % | TEMPERATURE: 98.2 F | BODY MASS INDEX: 35.69 KG/M2 | DIASTOLIC BLOOD PRESSURE: 85 MMHG | SYSTOLIC BLOOD PRESSURE: 123 MMHG

## 2022-11-11 DIAGNOSIS — H69.93 EUSTACHIAN TUBE DYSFUNCTION, BILATERAL: ICD-10-CM

## 2022-11-11 DIAGNOSIS — H10.33 ACUTE BACTERIAL CONJUNCTIVITIS OF BOTH EYES: Primary | ICD-10-CM

## 2022-11-11 DIAGNOSIS — J01.00 ACUTE NON-RECURRENT MAXILLARY SINUSITIS: ICD-10-CM

## 2022-11-11 PROCEDURE — 99214 OFFICE O/P EST MOD 30 MIN: CPT | Performed by: PHYSICIAN ASSISTANT

## 2022-11-11 RX ORDER — CETIRIZINE HYDROCHLORIDE 10 MG/1
10 TABLET ORAL DAILY
Qty: 30 TABLET | Refills: 0 | Status: SHIPPED | OUTPATIENT
Start: 2022-11-11 | End: 2022-12-11

## 2022-11-11 RX ORDER — CETIRIZINE HYDROCHLORIDE 10 MG/1
10 TABLET ORAL DAILY
COMMUNITY

## 2022-11-11 RX ORDER — FLUTICASONE PROPIONATE 50 MCG
1 SPRAY, SUSPENSION (ML) NASAL DAILY
Qty: 9.9 ML | Refills: 0 | Status: SHIPPED | OUTPATIENT
Start: 2022-11-11 | End: 2022-12-11

## 2022-11-11 RX ORDER — POLYMYXIN B SULFATE AND TRIMETHOPRIM 1; 10000 MG/ML; [USP'U]/ML
1-2 SOLUTION OPHTHALMIC EVERY 4 HOURS
Qty: 3 ML | Refills: 0 | Status: SHIPPED | OUTPATIENT
Start: 2022-11-11 | End: 2022-11-16
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
## 2022-11-12 NOTE — PATIENT INSTRUCTIONS
Use of over-the-counter Zyrtec.  Follow packaging directions  Use of over-the-counter Flonase  Frequent swallowing drinking and chewing gum to help create low-grade suction on the eustachian tube.  Elevate head at nighttime so it does not increase pressure  Use of over-the-counter Tylenol as needed for comfort.  Return to primary care provider for reevaluation treatment if any complication or new symptoms should present.        Patient Education     Earache, No Infection (Adult)  Earaches can happen without an infection. This occurs when air and fluid build up behind the eardrum causing a feeling of fullness and discomfort and reduced hearing. This is called otitis media with effusion (OME) or serous otitis media. It means there is fluid in the middle ear. It is not the same as acute otitis media, which is typically from infection.  OME can happen when you have a cold if congestion blocks the passage that drains the middle ear. This passage is called the eustachian tube. OME may also occur with nasal allergies or after a bacterial middle ear infection.    The pain or discomfort may come and go. You may hear clicking or popping sounds when you chew or swallow. You may feel that your balance is off. Or you may hear ringing in the ear.  It often takes from several weeks up to 3 months for the fluid to clear on its own. Oral pain relievers and ear drops help if there is pain. Decongestants and antihistamines sometimes help. Antibiotics don't help since there is no infection. Your doctor may prescribe a nasal spray to help reduce swelling in the nose and eustachian tube. This can allow the ear to drain.  If your OME doesn't improve after 3 months, surgery may be used to drain the fluid and insert a small tube in the eardrum to allow continued drainage.  Because the middle ear fluid can become infected, it is important to watch for signs of an ear infection which may develop later. These signs include increased ear pain,  fever, or drainage from the ear.  Home care  The following guidelines will help you care for yourself at home:  You may use over-the-counter medicine as directed to control pain, unless another medicine was prescribed. If you have chronic liver or kidney disease or ever had a stomach ulcer or GI bleeding, talk with your doctor before using these medicines. Aspirin should never be used in anyone under 18 years of age who is ill with a fever. It may cause severe liver damage.  You may use over-the-counter decongestants such as phenylephrine or pseudoephedrine. But they are not always helpful. Don't use nasal spray decongestants more than 3 days. Longer use can make congestion worse. Prescription nasal sprays from your doctor don't typically have those restrictions.  Antihistamines may help if you are also having allergy symptoms.  You may use medicines such as guaifenesin to thin mucus and promote drainage.  Follow-up care  Follow up with your healthcare provider or as advised if you are not feeling better after 3 days.  When to seek medical advice  Call your healthcare provider right away if any of the following occur:  Your ear pain gets worse or does not start to improve   Fever of 100.4 F (38 C) or higher, or as directed by your healthcare provider  Fluid or blood draining from the ear  Headache or sinus pain  Stiff neck  Unusual drowsiness or confusion  Date Last Reviewed: 10/1/2016    0220-1970 [employer]. [address] [place] [address]. All rights reserved. This information is not intended as a substitute for professional medical care. Always follow your healthcare professional's instructions.

## 2022-12-04 DIAGNOSIS — H69.93 EUSTACHIAN TUBE DYSFUNCTION, BILATERAL: ICD-10-CM

## 2022-12-05 RX ORDER — FLUTICASONE PROPIONATE 50 MCG
SPRAY, SUSPENSION (ML) NASAL
Qty: 16 ML | OUTPATIENT
Start: 2022-12-05

## 2023-01-14 ENCOUNTER — HEALTH MAINTENANCE LETTER (OUTPATIENT)
Age: 38
End: 2023-01-14

## 2023-02-21 NOTE — PROGRESS NOTES
"Renetta Blankenship is a 34 y.o. female who is being evaluated via a billable telephone visit.      The patient has been notified of following:     \"This telephone visit will be conducted via a call between you and your physician/provider. We have found that certain health care needs can be provided without the need for a physical exam.  This service lets us provide the care you need with a short phone conversation.  If a prescription is necessary we can send it directly to your pharmacy.  If lab work is needed we can place an order for that and you can then stop by our lab to have the test done at a later time.    Telephone visits are billed at different rates depending on your insurance coverage. During this emergency period, for some insurers they may be billed the same as an in-person visit.  Please reach out to your insurance provider with any questions.    If during the course of the call the physician/provider feels a telephone visit is not appropriate, you will not be charged for this service.\"    Patient has given verbal consent to a Telephone visit? Yes    What phone number would you like to be contacted at?   [phone]    Patient would like to receive their AVS by AVS Preference: Garrett.    Renetta Blankenship is a 34 y.o. female who is being evaluated via a billable telephone visit. Patient verbally consented to the video service today YES.        HPI: The patient is seen today via virtual phone visit regarding an ongoing discussion about work restrictions. The patient has asthma and has been suffering from flairs of her disease for the past couple of months. Although starting to come under better control, symptoms are persistent at this time. 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 Her PCP, Dr. Barba previously wrote a letter with recommendations to reduce patient's exposure risks during the COVID-19 pandemic. The patient reports " that her employer has taken the position that these are only recommendations and not requirements and that they are not required to honor them. The patient is seeing me today as her primary provider is out of the office.    I have reviewed and updated the patient's Past Medical History, Social History, Family History and Medication List.    ALLERGIES  Triamcinolone acetonide and Bacitracin      Assessment/Plan:  Problem List Items Addressed This Visit     Mild persistent asthma without complication - Primary      Given that I am not familiar with this patient, I am recommending continuing the discussion about her ongoing restrictions with her PCP. Her primary risk factor for severe disease is her asthma and I agree that this puts her at potential increased risk. I have written her a letter requiring virtual work only until Wed 5/26/2020. We have scheduled her for a virtual visit with her PCP on Tue 5/25/2020 to discuss requirements for ongoing restrictions. The patient was comfortable and agreeable with this plan.      Phone call duration:  7 minutes    Angelita Justice MD      [place]

## 2023-07-08 DIAGNOSIS — F43.22 ADJUSTMENT DISORDER WITH ANXIETY: ICD-10-CM

## 2023-07-08 NOTE — TELEPHONE ENCOUNTER
"Routing refill request to provider for review/approval because:  Patient needs to be seen because it has been more than 1 year since last office visit.  Message noted 4/7/2023 office visit required for further refills.   Last Written Prescription Date:  4/7/2023  Last Fill Quantity: 90,  # refills: 0   Last office visit provider: 11/11/2022 with BLAINE Nelson PA-C  2/9/2021 with Dr BLAINE Barba @ [facility].   Requested Prescriptions   Pending Prescriptions Disp Refills     FLUoxetine (PROZAC) 40 MG capsule [Pharmacy Med Name: FLUOXETINE HCL 40 MG CAPSULE] 90 capsule 0     Sig: TAKE 1 CAPSULE (40 MG) BY MOUTH DAILY **OFFICE VISIT REQUIRED FOR FURTHER REFILLS**       SSRIs Protocol Failed - 7/8/2023  8:35 AM        Failed - Recent (12 mo) or future (30 days) visit within the authorizing provider's specialty     Patient has had an office visit with the authorizing provider or a provider within the authorizing providers department within the previous 12 mos or has a future within next 30 days. See \"Patient Info\" tab in inbasket, or \"Choose Columns\" in Meds & Orders section of the refill encounter.              Passed - Medication is active on med list        Passed - Patient is age 18 or older        Passed - No active pregnancy on record        Passed - No positive pregnancy test in last 12 months             Carmela Lozoya RN 07/08/23 5:23 PM  "

## 2023-07-10 RX ORDER — FLUOXETINE 40 MG/1
40 CAPSULE ORAL DAILY
Qty: 10 CAPSULE | Refills: 0 | Status: SHIPPED | OUTPATIENT
Start: 2023-07-10

## 2024-02-11 ENCOUNTER — HEALTH MAINTENANCE LETTER (OUTPATIENT)
Age: 39
End: 2024-02-11

## 2024-03-29 ENCOUNTER — APPOINTMENT (OUTPATIENT)
Dept: URBAN - METROPOLITAN AREA CLINIC 260 | Age: 39
Setting detail: DERMATOLOGY
End: 2024-03-29

## 2024-03-29 VITALS — WEIGHT: 230 LBS | HEIGHT: 64 IN

## 2024-03-29 DIAGNOSIS — L82.1 OTHER SEBORRHEIC KERATOSIS: ICD-10-CM

## 2024-03-29 DIAGNOSIS — L81.4 OTHER MELANIN HYPERPIGMENTATION: ICD-10-CM

## 2024-03-29 DIAGNOSIS — Q826 OTHER SPECIFIED ANOMALIES OF SKIN: ICD-10-CM

## 2024-03-29 DIAGNOSIS — Z71.89 OTHER SPECIFIED COUNSELING: ICD-10-CM

## 2024-03-29 DIAGNOSIS — Q828 OTHER SPECIFIED ANOMALIES OF SKIN: ICD-10-CM

## 2024-03-29 DIAGNOSIS — D22 MELANOCYTIC NEVI: ICD-10-CM

## 2024-03-29 DIAGNOSIS — Q819 OTHER SPECIFIED ANOMALIES OF SKIN: ICD-10-CM

## 2024-03-29 DIAGNOSIS — D18.0 HEMANGIOMA: ICD-10-CM

## 2024-03-29 PROBLEM — D22.5 MELANOCYTIC NEVI OF TRUNK: Status: ACTIVE | Noted: 2024-03-29

## 2024-03-29 PROBLEM — D18.01 HEMANGIOMA OF SKIN AND SUBCUTANEOUS TISSUE: Status: ACTIVE | Noted: 2024-03-29

## 2024-03-29 PROBLEM — L85.8 OTHER SPECIFIED EPIDERMAL THICKENING: Status: ACTIVE | Noted: 2024-03-29

## 2024-03-29 PROCEDURE — 99203 OFFICE O/P NEW LOW 30 MIN: CPT

## 2024-03-29 PROCEDURE — OTHER MIPS QUALITY: OTHER

## 2024-03-29 PROCEDURE — OTHER COUNSELING: OTHER

## 2024-03-29 ASSESSMENT — LOCATION SIMPLE DESCRIPTION DERM
LOCATION SIMPLE: UPPER BACK
LOCATION SIMPLE: RIGHT POSTERIOR UPPER ARM
LOCATION SIMPLE: LOWER BACK
LOCATION SIMPLE: LEFT POSTERIOR UPPER ARM

## 2024-03-29 ASSESSMENT — LOCATION DETAILED DESCRIPTION DERM
LOCATION DETAILED: INFERIOR THORACIC SPINE
LOCATION DETAILED: LEFT PROXIMAL POSTERIOR UPPER ARM
LOCATION DETAILED: RIGHT PROXIMAL POSTERIOR UPPER ARM
LOCATION DETAILED: SUPERIOR LUMBAR SPINE

## 2024-03-29 ASSESSMENT — LOCATION ZONE DERM
LOCATION ZONE: TRUNK
LOCATION ZONE: ARM

## 2024-03-29 NOTE — HPI: FULL BODY SKIN EXAMINATION
How Severe Are Your Spot(S)?: moderate
What Type Of Note Output Would You Prefer (Optional)?: Standard Output
What Is The Reason For Today's Visit?: Full Body Skin Examination
What Is The Reason For Today's Visit? (Being Monitored For X): concerning skin lesions on an annual basis
Additional History: Son recently got diagnosed with a deep penetrating nevus, so she is coming in to get a FBE.

## 2024-06-30 ENCOUNTER — HEALTH MAINTENANCE LETTER (OUTPATIENT)
Age: 39
End: 2024-06-30

## 2025-03-08 ENCOUNTER — HEALTH MAINTENANCE LETTER (OUTPATIENT)
Age: 40
End: 2025-03-08

## 2025-05-01 ENCOUNTER — TRANSFERRED RECORDS (OUTPATIENT)
Dept: HEALTH INFORMATION MANAGEMENT | Facility: CLINIC | Age: 40
End: 2025-05-01
Payer: COMMERCIAL